# Patient Record
Sex: MALE | Race: WHITE | NOT HISPANIC OR LATINO | Employment: UNEMPLOYED | URBAN - METROPOLITAN AREA
[De-identification: names, ages, dates, MRNs, and addresses within clinical notes are randomized per-mention and may not be internally consistent; named-entity substitution may affect disease eponyms.]

---

## 2017-01-05 ENCOUNTER — OFFICE VISIT (OUTPATIENT)
Dept: NEUROLOGY | Facility: CLINIC | Age: 52
End: 2017-01-05

## 2017-01-05 VITALS
SYSTOLIC BLOOD PRESSURE: 136 MMHG | BODY MASS INDEX: 34.01 KG/M2 | HEIGHT: 74 IN | WEIGHT: 265 LBS | DIASTOLIC BLOOD PRESSURE: 84 MMHG

## 2017-01-05 DIAGNOSIS — R20.0 NUMBNESS AND TINGLING: Primary | ICD-10-CM

## 2017-01-05 DIAGNOSIS — R20.2 NUMBNESS AND TINGLING: Primary | ICD-10-CM

## 2017-01-05 PROCEDURE — 99244 OFF/OP CNSLTJ NEW/EST MOD 40: CPT | Performed by: PSYCHIATRY & NEUROLOGY

## 2017-01-05 NOTE — PROGRESS NOTES
"Subjective   Ken Richard is a 51 y.o. male.     History of Present Illness     The following portions of the patient's history were reviewed today and updated as appropriate:  allergies, current medications, past family history, past medical history, past social history, past surgical history and problem list.      Chief complaint is numbness and the patient is seen today in consultation at the request of the referring health care provider  The time I spent with the patient today was used discussing the differential diagnosis, treatment and management options and prognosis. I addressed all of the patient's questions.  The patient has developed after chemotherapy worsening hearing problems as well as his difficulties with numbness in both hands on occasion improved with certain head positions. He also had radiation to the neck and he had a neck injury a few years ago. He has right shoulder pain also.    Review of Systems   Constitutional: Negative for appetite change, chills and fatigue.   HENT: Negative for congestion, ear pain, facial swelling and sinus pressure.    Eyes: Negative for pain and redness.   Respiratory: Negative for shortness of breath.    Cardiovascular: Negative for chest pain.   Gastrointestinal: Negative for abdominal pain.   Endocrine: Negative for cold intolerance and heat intolerance.   Genitourinary: Negative for dysuria.   Musculoskeletal: Negative for arthralgias.   Skin: Negative for rash.   Allergic/Immunologic: Negative for immunocompromised state.   Neurological: Positive for numbness.   Hematological: Negative for adenopathy.   Psychiatric/Behavioral: Negative for hallucinations.         Objective      height is 74\" (188 cm) and weight is 265 lb (120 kg). His blood pressure is 136/84.     Carotid pulses were palpable bilaterally  The ophthalmological exam showed no blurring of disc margins.    Neurologic Exam     Mental Status   Oriented to person.   Oriented to place. Oriented to " country, city, area and street.   Oriented to time. Oriented to year, month, date, day and season.   Registration: recalls 3 of 3 objects. Recall at 5 minutes: recalls 3 of 3 objects. Follows 3 step commands.   Attention: normal.   Speech: speech is normal   Level of consciousness: alert  Knowledge: consistent with education.   Able to name object. Able to read. Able to repeat. Able to write. Normal comprehension.     Cranial Nerves     CN II   Visual fields full to confrontation.     CN III, IV, VI   Right pupil: Shape: regular. Consensual response: intact. Accommodation: intact.   Left pupil: Shape: regular. Consensual response: intact.   CN III: no CN III palsy  CN VI: no CN VI palsy  Nystagmus: none   Diplopia: none  Ophthalmoparesis: none  Upgaze: normal  Downgaze: normal  Conjugate gaze: present  Vestibulo-ocular reflex: present    CN V   Facial sensation intact.     CN VII   Facial expression full, symmetric.     CN VIII   CN VIII normal.     CN IX, X   CN IX normal.     CN XI   CN XI normal.     CN XII   CN XII normal.     Motor Exam   Muscle bulk: normal  Overall muscle tone: normal  Right arm pronator drift: absent  Left arm pronator drift: absent    Strength   Strength 5/5 except as noted.     Sensory Exam   Right arm light touch: decreased from fingers  Left arm light touch: decreased from fingers  Right leg vibration: decreased from toes  Left leg vibration: decreased from toes    Gait, Coordination, and Reflexes     Gait  Gait: normal    Coordination   Romberg: negative  Finger to nose coordination: normal  Heel to shin coordination: normal  Tandem walking coordination: normal    Tremor   Resting tremor: absent  Intention tremor: absent  Action tremor: absent    Reflexes   Reflexes 2+ except as noted.       Assessment/Plan     Ken was seen today for numbness.    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 He also probably has a right-sided carpal tunnel syndrome because his thenar is flat and he may have an ulnar nerve compression also more in the left than the right side because of his curved little finger. The pain in the shoulder appears to be more likely intrinsic shoulder problem because he has issues with supraspinatus loading test and abduction of the shoulder.  He will be scheduled for EMG nerve conduction studies of both upper extremities look at the possibility of a cervical radiculopathy and nerve compression. I will then discuss further management options with him              Disclaimer: This letter was created using dragon voice recognition software.  This voice recognition software may create errors that may go undetected and can impact the meaning of a sentence or paragraph.  The most frequent error is that the software misidentifies \"he\" and \"she\". However, contextual reading is often able to identify this as a voice recognotion error.  Another frequent error is that the software misidentifies \"the patient\" as \"\"          "

## 2017-01-05 NOTE — LETTER
"January 5, 2017     Isael Francois MD  [address]  [address]  [place] [address]    Patient: Ken Richard   YOB: 1965   Date of Visit: 1/5/2017       Dear Dr. Alessandro MD:    Thank you for referring Ken Richard to me for evaluation. Below are the relevant portions of my assessment and plan of care.        Ken was seen today for numbness.    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 He also probably has a right-sided carpal tunnel syndrome because his thenar is flat and he may have an ulnar nerve compression also more in the left than the right side because of his curved little finger. The pain in the shoulder appears to be more likely intrinsic shoulder problem because he has issues with supraspinatus loading test and abduction of the shoulder.  He will be scheduled for EMG nerve conduction studies of both upper extremities look at the possibility of a cervical radiculopathy and nerve compression. I will then discuss further management options with him              Disclaimer: This letter was created using dragon voice recognition software.  This voice recognition software may create errors that may go undetected and can impact the meaning of a sentence or paragraph.  The most frequent error is that the software misidentifies \"he\" and \"she\". However, contextual reading is often able to identify this as a voice recognotion error.  Another frequent error is that the software misidentifies \"the patient\" as \"\"                        If you have questions, please do not hesitate to call me. I look forward to following Ken along with you.         Sincerely,        Oliver Ayala MD        CC: Maximo JULIAN MD      "

## 2017-01-09 ENCOUNTER — OFFICE VISIT (OUTPATIENT)
Dept: RADIATION ONCOLOGY | Facility: HOSPITAL | Age: 52
End: 2017-01-09

## 2017-01-09 ENCOUNTER — HOSPITAL ENCOUNTER (OUTPATIENT)
Dept: RADIATION ONCOLOGY | Facility: HOSPITAL | Age: 52
Setting detail: RADIATION/ONCOLOGY SERIES
Discharge: HOME | End: 2017-01-09

## 2017-01-09 VITALS
DIASTOLIC BLOOD PRESSURE: 83 MMHG | HEIGHT: 74 IN | RESPIRATION RATE: 16 BRPM | SYSTOLIC BLOOD PRESSURE: 133 MMHG | BODY MASS INDEX: 34.42 KG/M2 | WEIGHT: 268.2 LBS | TEMPERATURE: 98.3 F | OXYGEN SATURATION: 98 % | HEART RATE: 82 BPM

## 2017-01-09 DIAGNOSIS — C09.9 SQUAMOUS CELL CARCINOMA OF LEFT TONSIL (HCC): Primary | ICD-10-CM

## 2017-01-09 NOTE — PROGRESS NOTES
FOLLOW UP NOTE    PATIENT:                                                      Ken Richard  MEDICAL RECORD #:                        [id number]  :                                                          1965  COMPLETION DATE:   2016  DIAGNOSIS:     R. Tonsil Cancer      BRIEF HISTORY:    Routine follow-up visit.  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  After initial poor tolerance with significant nausea, anorexia and weight loss, all of his symptoms have improved or resolved.  He has regained all of his weight and although taste is not completely normal, he denies any dysphagia or odynophagia.  He's had a complete clinical remission.  Restaging PET CT in 2016 showed no evidence of residual neoplasm.    MEDICATIONS: Medication reconciliation for the patient was reviewed and confirmed in the electronic medical record.    Review of Systems   Musculoskeletal:        Neuropathy in arms and hands   Neurological: Positive for numbness.       KPS 90%    Physical Exam   Constitutional: He is oriented to person, place, and time. He appears well-developed and well-nourished.   HENT:   Head: Normocephalic and atraumatic.   Mouth/Throat: Oropharynx is clear and moist.   Mouth is moist and teeth are in good repair.  No suspicious tumor.  No trismus.  The overlying skin is well-healed.  There is a slightly fibrotic left upper neck incision with no associated mass and minimal defect.   Neck: Normal range of motion. Neck supple.   Cardiovascular: Normal rate, regular rhythm and normal heart sounds.    No murmur heard.  Pulmonary/Chest: Effort normal and breath sounds normal. He has no wheezes. He has no rales.   Abdominal: Soft. Bowel sounds are normal. He exhibits no distension. There is no hepatosplenomegaly. There is no tenderness.   Musculoskeletal: Normal range of motion. He exhibits no edema or tenderness.  "  Lymphadenopathy:     He has no cervical adenopathy.     He has no axillary adenopathy.        Right: No supraclavicular adenopathy present.        Left: No supraclavicular adenopathy present.   Neurological: He is alert and oriented to person, place, and time. He has normal strength. No sensory deficit.   Skin: Skin is warm and dry.   Psychiatric: He has a normal mood and affect. His behavior is normal. Judgment and thought content normal.   Nursing note and vitals reviewed.      VITAL SIGNS:   Vitals:    01/09/17 1453   BP: 133/83   Pulse: 82   Resp: 16   Temp: 98.3 °F (36.8 °C)   TempSrc: Oral   SpO2: 98%   Weight: 268 lb 3.2 oz (122 kg)   Height: 74\" (188 cm)   PainSc: 2  Comment: Shoulders and hands form neuropathy       The following portions of the patient's history were reviewed and updated as appropriate: allergies, current medications, past family history, past medical history, past social history, past surgical history and problem list.         Squamous cell carcinoma of left tonsil [C09.9]    IMPRESSION:  No evidence of recurrent head and neck cancer.     RECOMMENDATIONS:  Continue surveillance with ENT and medical oncology.  I also encouraged continued good oral care and at least biannual dental cleaning.  Physical activity and maintenance of body weight was also encouraged.     Return if symptoms worsen or fail to improve.     Boone Emery MD    Errors in dictation may reflect use of voice recognition software and not all errors in transcription may have been detected prior to signing.  "

## 2017-01-18 ENCOUNTER — OFFICE VISIT (OUTPATIENT)
Dept: NEUROLOGY | Facility: CLINIC | Age: 52
End: 2017-01-18

## 2017-01-18 DIAGNOSIS — R20.0 NUMBNESS AND TINGLING: Primary | ICD-10-CM

## 2017-01-18 DIAGNOSIS — G56.23 CUBITAL TUNNEL SYNDROME OF BOTH UPPER EXTREMITIES: ICD-10-CM

## 2017-01-18 DIAGNOSIS — G56.03 BILATERAL CARPAL TUNNEL SYNDROME: ICD-10-CM

## 2017-01-18 DIAGNOSIS — R20.2 NUMBNESS AND TINGLING: Primary | ICD-10-CM

## 2017-01-18 PROCEDURE — 95911 NRV CNDJ TEST 9-10 STUDIES: CPT | Performed by: PSYCHIATRY & NEUROLOGY

## 2017-01-18 PROCEDURE — 95886 MUSC TEST DONE W/N TEST COMP: CPT | Performed by: PSYCHIATRY & NEUROLOGY

## 2017-01-18 NOTE — LETTER
January 18, 2017     Isael Francois MD  [address]  [address]  [place] [address]    Patient: Ken Richard   YOB: 1965   Date of Visit: 1/18/2017       Dear Dr. Alessandro MD:    Thank you for referring Ken Richard to me for evaluation. Below are the relevant portions of my assessment and plan of care.                   If you have questions, please do not hesitate to call me. I look forward to following Ken along with you.         Sincerely,        Oliver Ayala MD        CC: No Recipients

## 2017-01-18 NOTE — PROGRESS NOTES
Procedure   EMG & Nerve Conduction Test  Date/Time: 1/18/2017 10:07 AM  Performed by: HI YOUSIF  Authorized by: HI YOUSIF                 [facility]   [address] [place] [address], [address]  [place]  [address]   Phone: [phone]  FAX: [phone]           Patient: henok arnett YOB: 1965  Gender: Male  Reason for study: see below in history section      Visit Date: 1/18/2017 10:32  Age: 51 Years 3 Months Old  Examining Physician: Jamie       The patient has a chief complaint and history of bilateral, upper extremity, numbness,    The patient is referred to have this problem evaluated today with EMG and nerve conduction studies.  The performing physician also acted as a technician on this study.    A brief neurological exam, revealed no abnormalities in muscle bulk strength reflexes and sensationbilateral, upper extremity, numbness,      Sensory NCS      Nerve / Sites Rec. Site Distance Onset Lat NP Amp Onset Nick     cm ms µV m/s   L Median, Ulnar - Ring finger comparison      Median Wrist Ring finger 14 4.0 2.3 35      Ulnar Wrist Ring finger 14 4.0 2.1 35   R Median, Ulnar - Ring finger comparison      Median Wrist Ring finger 14 4.6 2.1 30      Ulnar Wrist Ring finger 14 4.1 5.6 34   L Median, Radial - Thumb comparison      Median Wrist Thumb 10 4.2 2.6 24      Radial Wrist Thumb 10 2.7 8.1 38           R Median, Radial - Thumb comparison      Median Wrist Thumb 10 4.8 2.1 21      Radial Wrist Thumb 10 2.0 6.2 49               Motor NCS      Nerve / Sites Muscle Distance Latency Amplitude Velocity     cm ms mV m/s   L Median - APB      Wrist APB 8 7.66 10.1       Elbow APB 21 11.30 10.7 58   R Median - APB      Wrist APB 8 6.15 7.5       Elbow APB 21 9.58 7.9 61   L Ulnar - ADM      Wrist ADM 8 4.01 6.1       B.Elbow ADM 12 5.68 5.3 72      A.Elbow ADM 20 10.52 5.6 41   R Ulnar - ADM      Wrist ADM 8 3.85 7.0       B.Elbow ADM 12 5.73 6.5 64      A.Elbow ADM 20  9.48 6.2 53       F  Wave      Nerve F-min    ms   L Median - APB 29   L Ulnar - ADM 35   R Ulnar - ADM 34   R Median - APB 29     EMG Summary Table     Spontaneous MUAP Recruitment    IA Fib PSW Fasc H.F. Amp Dur. PPP Pattern   L. ABD POLL BREVIS N None None None None N N N N   L. FIRST D INTEROSS N None None None None N N N N   L. FLEX POLL LONG N None None None None N N N N   L. PRON QUAD N None None None None N N N N   L. PRON TERES N None None None None N N N N   L. TRICEPS N None None None None N N N N   L. DELTOID N None None None None N N N N   L. CERV PSPINAL N None None None None N N N N   R. CERV PSPINAL N None None None None N N N N   R. TRICEPS N None None None None N N N N   R. DELTOID N None None None None N N N N   R. ABD POLL BREVIS N None None None None N N N N   R. FIRST D INTEROSS N None None None None N N N N   R. FLEX POLL LONG N None None None None N N N N   R. PRON QUAD N None None None None N N N N   R. PRON TERES N None None None None N N N N     1.) SNAPs showed increased radial to median latency difference (>0.4m/s)  2.) CMAPs showed slowing of the median nerves over the wrist       and ulnar nerves over the cubital tunnel  3.) F-waves were normal where recordable  4.) EMG was normal in all muscles tested.    These findings are compatible with    1.) moderate bilateral carpal tunnel syndrome  2.) mild bilateral cubital tunnel syndrome    The patient will be sent to hand surgery for further care    All NCS were performed at 32C or greater.        Oliver Ayala M.D.                   Diagnoses and all orders for this visit:    Numbness and tingling

## 2017-04-10 ENCOUNTER — TRANSCRIBE ORDERS (OUTPATIENT)
Dept: ADMINISTRATIVE | Facility: HOSPITAL | Age: 52
End: 2017-04-10

## 2017-04-21 ENCOUNTER — OFFICE VISIT (OUTPATIENT)
Dept: ONCOLOGY | Facility: CLINIC | Age: 52
End: 2017-04-21

## 2017-04-21 VITALS
HEART RATE: 89 BPM | BODY MASS INDEX: 37.35 KG/M2 | WEIGHT: 291 LBS | HEIGHT: 74 IN | TEMPERATURE: 97.9 F | DIASTOLIC BLOOD PRESSURE: 93 MMHG | RESPIRATION RATE: 16 BRPM | SYSTOLIC BLOOD PRESSURE: 148 MMHG

## 2017-04-21 DIAGNOSIS — C09.9 SQUAMOUS CELL CARCINOMA OF LEFT TONSIL (HCC): Primary | ICD-10-CM

## 2017-04-21 PROCEDURE — 99213 OFFICE O/P EST LOW 20 MIN: CPT | Performed by: NURSE PRACTITIONER

## 2017-04-21 NOTE — PROGRESS NOTES
"      PROBLEM LIST:  Oncology/Hematology History     . 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   2. Severe allergic reaction to cetuximab. Pt did not tolerate cisplatin due to Severe Nausea  3. Completed IFRT on 5/25/16  4. Pet Scan 4 months post treatment 10/2016 - Negative       Squamous cell carcinoma of left tonsil     2/17/2016 Initial Diagnosis     Squamous cell carcinoma of left tonsil     4/4/2016 - 5/25/2016 Radiation     Radiation OncologyTreatment Course: Dr. Emery          REASON FOR VISIT: Follow-up of my head and neck cancer      Subjective     HISTORY OF PRESENT ILLNESS:   Mr. Richard is here for follow up evaluation of stage II squamous cell carcinoma of the left tonsil. Clinically doing well. He is eating and drinking without difficulty. He is slowly regaining his physical stamina. He reports the numbness in bilateral hands is much improved after an injection and braces for carpal tunnel. He is maintaining his usual daily activities.      Past Medical History, Past Surgical History, Social History, Family History have been reviewed and are without significant changes except as mentioned.    Review of Systems   A comprehensive 14 point review of systems was performed and was negative except as mentioned.    Medications:  The current medication list was reviewed in the EMR    ALLERGIES:    Allergies   Allergen Reactions   • Adhesive Tape    • Bactrim [Sulfamethoxazole-Trimethoprim]    • Banana    • Eggs Or Egg-Derived Products    • Levofloxacin    • Peanut-Containing Drug Products    • Sulfa Antibiotics    • Wheat Extract        Objective      /93  Pulse 89  Temp 97.9 °F (36.6 °C) (Temporal Artery )   Resp 16  Ht 74\" (188 cm)  Wt 291 lb (132 kg)  BMI 37.36 kg/m2         General: well appearing, in no acute distress   HEENT: sclera anicteric, oropharynx clear, neck is supple  Lymphatics: no " cervical, supraclavicular, or axillary adenopathy  Cardiovascular: regular rate and rhythm, no murmurs  Lungs: clear to auscultation bilaterally  Abdomen: soft, nontender, nondistended.  No palpable masses or organomegaly  Extremeties: no lower extremity edema, cords or calf tenderness  Skin: no rashes, lesions, bruising, or petechiae    RECENT LABS:  Hematology WBC   Date Value Ref Range Status   06/20/2016 4.56 3.50 - 10.80 10*3/mm3 Final     Hemoglobin   Date Value Ref Range Status   06/20/2016 10.7 (L) 13.1 - 17.5 g/dL Final     Hematocrit   Date Value Ref Range Status   06/20/2016 31.7 (L) 38.9 - 50.9 % Final     MCV   Date Value Ref Range Status   06/20/2016 98.8 80.0 - 99.0 fL Final     RDW   Date Value Ref Range Status   06/20/2016 15.3 (H) 11.3 - 14.5 % Final     MPV   Date Value Ref Range Status   06/20/2016 9.3 6.0 - 12.0 fL Final     Platelets   Date Value Ref Range Status   06/20/2016 210 150 - 450 10*3/mm3 Final     Immature Grans %   Date Value Ref Range Status   06/20/2016 0.2 0.0 - 0.6 % Final     Neutrophils, Absolute   Date Value Ref Range Status   06/20/2016 3.86 1.50 - 8.30 10*3/mm3 Final     Lymphocytes, Absolute   Date Value Ref Range Status   06/20/2016 0.40 (L) 0.60 - 4.80 10*3/mm3 Final     Monocytes, Absolute   Date Value Ref Range Status   06/20/2016 0.21 0.00 - 1.00 10*3/mm3 Final     Eosinophils, Absolute   Date Value Ref Range Status   06/20/2016 0.06 (L) 0.10 - 0.30 10*3/mm3 Final     Basophils, Absolute   Date Value Ref Range Status   06/20/2016 0.02 0.00 - 0.20 10*3/mm3 Final     Immature Grans, Absolute   Date Value Ref Range Status   06/20/2016 0.01 0.00 - 0.03 10*3/mm3 Final       Glucose   Date Value Ref Range Status   06/20/2016 116 (H) 70 - 100 mg/dL Final     Sodium   Date Value Ref Range Status   06/20/2016 143 132 - 146 mmol/L Final     Potassium   Date Value Ref Range Status   06/20/2016 3.9 3.5 - 5.5 mmol/L Final     CO2   Date Value Ref Range Status   06/20/2016 28.0  20.0 - 31.0 mmol/L Final     Chloride   Date Value Ref Range Status   06/20/2016 109 99 - 109 mmol/L Final     Anion Gap   Date Value Ref Range Status   06/20/2016 6.0 3.0 - 11.0 mmol/L Final     Creatinine   Date Value Ref Range Status   06/20/2016 0.90 0.60 - 1.30 mg/dL Final     BUN   Date Value Ref Range Status   06/20/2016 12 9 - 23 mg/dL Final     BUN/Creatinine Ratio   Date Value Ref Range Status   06/20/2016 13.3 7.0 - 25.0 Final     Calcium   Date Value Ref Range Status   06/20/2016 9.1 8.7 - 10.4 mg/dL Final     eGFR Non  Amer   Date Value Ref Range Status   06/20/2016 90 >60 mL/min/1.73 Final     Alkaline Phosphatase   Date Value Ref Range Status   06/20/2016 65 25 - 100 U/L Final     Total Protein   Date Value Ref Range Status   06/20/2016 6.3 5.7 - 8.2 g/dL Final     ALT (SGPT)   Date Value Ref Range Status   06/20/2016 94 (H) 7 - 40 U/L Final     AST (SGOT)   Date Value Ref Range Status   06/20/2016 68 (H) 0 - 33 U/L Final     Total Bilirubin   Date Value Ref Range Status   06/20/2016 0.6 0.3 - 1.2 mg/dL Final     Albumin   Date Value Ref Range Status   06/20/2016 3.70 3.20 - 4.80 g/dL Final     Globulin   Date Value Ref Range Status   06/20/2016 2.6 gm/dL Final     A/G Ratio   Date Value Ref Range Status   06/20/2016 1.4 g/dL Final       No results found for: LDH, URICACID       Assessment/Plan   51-year-old gentleman with stage II head and neck cancer. Now 1 year from diagnosis. Clinically no sign of recurrence. He reports he had a PET scan at the [facility] per Dr. Wood a couple of weeks and it was normal. We will request a copy of the report for review. We will plan on seeing him back in 6 months for follow up evaluation. He has been instructed to contact us in the interm if any new symptoms arise.                   Sybil Amaya APRCLAUDIO  [facility]    4/21/2017

## 2017-05-12 ENCOUNTER — HOSPITAL ENCOUNTER (OUTPATIENT)
Dept: GENERAL RADIOLOGY | Facility: HOSPITAL | Age: 52
Discharge: HOME | End: 2017-05-12
Admitting: SURGERY

## 2017-05-12 ENCOUNTER — APPOINTMENT (OUTPATIENT)
Dept: PREADMISSION TESTING | Facility: HOSPITAL | Age: 52
End: 2017-05-12

## 2017-05-12 LAB
ANION GAP SERPL CALCULATED.3IONS-SCNC: -1 MMOL/L (ref 3–11)
BUN BLD-MCNC: 12 MG/DL (ref 9–23)
BUN/CREAT SERPL: 15 (ref 7–25)
CALCIUM SPEC-SCNC: 9.6 MG/DL (ref 8.7–10.4)
CHLORIDE SERPL-SCNC: 108 MMOL/L (ref 99–109)
CO2 SERPL-SCNC: 32 MMOL/L (ref 20–31)
CREAT BLD-MCNC: 0.8 MG/DL (ref 0.6–1.3)
DEPRECATED RDW RBC AUTO: 43.6 FL (ref 37–54)
ERYTHROCYTE [DISTWIDTH] IN BLOOD BY AUTOMATED COUNT: 12.3 % (ref 11.3–14.5)
GFR SERPL CREATININE-BSD FRML MDRD: 103 ML/MIN/1.73
GLUCOSE BLD-MCNC: 102 MG/DL (ref 70–100)
HCT VFR BLD AUTO: 41.5 % (ref 38.9–50.9)
HGB BLD-MCNC: 14.1 G/DL (ref 13.1–17.5)
MCH RBC QN AUTO: 33 PG (ref 27–31)
MCHC RBC AUTO-ENTMCNC: 34 G/DL (ref 32–36)
MCV RBC AUTO: 97.2 FL (ref 80–99)
PLATELET # BLD AUTO: 167 10*3/MM3 (ref 150–450)
PMV BLD AUTO: 10.2 FL (ref 6–12)
POTASSIUM BLD-SCNC: 4 MMOL/L (ref 3.5–5.5)
RBC # BLD AUTO: 4.27 10*6/MM3 (ref 4.2–5.76)
SODIUM BLD-SCNC: 139 MMOL/L (ref 132–146)
WBC NRBC COR # BLD: 4.93 10*3/MM3 (ref 3.5–10.8)

## 2017-05-12 PROCEDURE — 85027 COMPLETE CBC AUTOMATED: CPT | Performed by: SURGERY

## 2017-05-12 PROCEDURE — 36415 COLL VENOUS BLD VENIPUNCTURE: CPT

## 2017-05-12 PROCEDURE — 71020 HC CHEST PA AND LATERAL: CPT

## 2017-05-12 PROCEDURE — 80048 BASIC METABOLIC PNL TOTAL CA: CPT | Performed by: SURGERY

## 2017-08-03 ENCOUNTER — TELEPHONE (OUTPATIENT)
Dept: ONCOLOGY | Facility: CLINIC | Age: 52
End: 2017-08-03

## 2017-08-03 NOTE — TELEPHONE ENCOUNTER
Educated that patient is ok to take thyroid medication and PCP to monitor.  Patient verbalized understanding.

## 2017-08-03 NOTE — TELEPHONE ENCOUNTER
----- Message from Silverio Salazar sent at 8/3/2017  9:21 AM EDT -----  Regarding: Gil medication question  Contact: [phone]  Patient was prescribed Levothyroxine from Jaylin KIM at [facility] in [facility], he wants to know if it was ok to take. Taking it for his thyroid. Patient was not sure of what his levels were.  [phone] is the number I pulled from the web for you.

## 2017-08-16 ENCOUNTER — OFFICE VISIT (OUTPATIENT)
Dept: ONCOLOGY | Facility: CLINIC | Age: 52
End: 2017-08-16

## 2017-08-16 VITALS
BODY MASS INDEX: 37.73 KG/M2 | SYSTOLIC BLOOD PRESSURE: 142 MMHG | DIASTOLIC BLOOD PRESSURE: 86 MMHG | HEART RATE: 96 BPM | TEMPERATURE: 98.3 F | RESPIRATION RATE: 20 BRPM | WEIGHT: 294 LBS | HEIGHT: 74 IN

## 2017-08-16 DIAGNOSIS — E89.0 POSTABLATIVE HYPOTHYROIDISM: Primary | ICD-10-CM

## 2017-08-16 PROCEDURE — 99213 OFFICE O/P EST LOW 20 MIN: CPT | Performed by: INTERNAL MEDICINE

## 2017-08-16 RX ORDER — LEVOTHYROXINE SODIUM 0.05 MG/1
TABLET ORAL
COMMUNITY
Start: 2017-08-02 | End: 2018-10-25 | Stop reason: DRUGHIGH

## 2017-08-16 NOTE — PROGRESS NOTES
PROBLEM LIST:  Oncology/Hematology History     . 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   2. Severe allergic reaction to cetuximab. Pt did not tolerate cisplatin due to Severe Nausea  3. Completed IFRT on 5/25/16  4. Pet Scan 4 months post treatment 10/2016 - Negative       Squamous cell carcinoma of left tonsil     2/17/2016 Initial Diagnosis     Squamous cell carcinoma of left tonsil     4/4/2016 - 5/25/2016 Radiation     Radiation OncologyTreatment Course: Dr. Emery          REASON FOR VISIT: Follow-up of my head and neck cancer      Subjective     HISTORY OF PRESENT ILLNESS:   Mr. Richard is here for follow up evaluation of stage II squamous cell carcinoma of the left tonsil. He is now year and a half out from his diagnosis.  Doing clinically well.  He's gained all his weight back.  He was diagnosed with hypothyroidism which is expected due to the radiation.  Otherwise denies any dysphagia.  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  He is almost back to his pre-cancer weight of 309.    Past Medical History, Past Surgical History, Social History, Family History have been reviewed and are without significant changes except as mentioned.    Review of Systems   Constitutional: Positive for fatigue and unexpected weight change.   HENT: Positive for hearing loss.    Eyes: Negative.    Respiratory: Negative.    Cardiovascular: Negative.    Gastrointestinal: Negative.    Endocrine: Negative.    Genitourinary: Negative.    Musculoskeletal: Negative.    Skin: Negative.    Allergic/Immunologic: Negative.    Neurological: Negative.    Hematological: Negative.    Psychiatric/Behavioral: Negative.       A comprehensive 14 point review of systems was performed and was negative except as mentioned.    Medications:  The current medication list was reviewed in the EMR    ALLERGIES:    Allergies   Allergen  "Reactions   • Adhesive Tape    • Bactrim [Sulfamethoxazole-Trimethoprim]    • Banana    • Eggs Or Egg-Derived Products    • Levofloxacin    • Peanut-Containing Drug Products    • Sulfa Antibiotics    • Wheat Extract        Objective      /86  Pulse 96  Temp 98.3 °F (36.8 °C)  Resp 20  Ht 74\" (188 cm)  Wt 294 lb (133 kg)  BMI 37.75 kg/m2         General: well appearing, in no acute distress   HEENT: sclera anicteric, oropharynx clear, neck is supple  Lymphatics: no cervical, supraclavicular, or axillary adenopathy  Cardiovascular: regular rate and rhythm, no murmurs  Lungs: clear to auscultation bilaterally  Abdomen: soft, nontender, nondistended.  No palpable masses or organomegaly  Extremeties: no lower extremity edema, cords or calf tenderness  Skin: no rashes, lesions, bruising, or petechiae        Assessment/Plan     51-year-old gentleman with stage II head and neck cancer.  His exam is fairly unrevealing.  He had a PET scan with Dr. Wood in April that was clear.  His hypothyroidism is being managed by Ms. Daley his PCP.  Otherwise he needs to work on maintaing a healthy diet and exercise.  I will see him back in 6 months.            Kathleen Cervantes MD  [facility] Hematology and Oncology    8/16/2017    "

## 2017-11-15 ENCOUNTER — TELEPHONE (OUTPATIENT)
Dept: RADIATION ONCOLOGY | Facility: HOSPITAL | Age: 52
End: 2017-11-15

## 2017-11-15 NOTE — TELEPHONE ENCOUNTER
Pt's wife called stating that pt is having severe muscle spasms in his neck.  She says that they are so severe sometimes that his hearing aide comes out.  She denies the pt having a hard time swallowing or having pain inside his throat.  Discussed with Dr. Emery and told the pt he recommends that she get the pt in to see his ENT.  Wife verbalized understanding.

## 2017-11-28 ENCOUNTER — OFFICE VISIT (OUTPATIENT)
Dept: RADIATION ONCOLOGY | Facility: HOSPITAL | Age: 52
End: 2017-11-28

## 2017-11-28 ENCOUNTER — TRANSCRIBE ORDERS (OUTPATIENT)
Dept: ADMINISTRATIVE | Facility: HOSPITAL | Age: 52
End: 2017-11-28

## 2017-11-28 ENCOUNTER — HOSPITAL ENCOUNTER (OUTPATIENT)
Dept: RADIATION ONCOLOGY | Facility: HOSPITAL | Age: 52
Setting detail: RADIATION/ONCOLOGY SERIES
Discharge: HOME | End: 2017-11-28

## 2017-11-28 ENCOUNTER — DOCUMENTATION (OUTPATIENT)
Dept: NUTRITION | Facility: HOSPITAL | Age: 52
End: 2017-11-28

## 2017-11-28 VITALS
WEIGHT: 296 LBS | BODY MASS INDEX: 38 KG/M2 | SYSTOLIC BLOOD PRESSURE: 141 MMHG | OXYGEN SATURATION: 97 % | DIASTOLIC BLOOD PRESSURE: 96 MMHG | RESPIRATION RATE: 20 BRPM | HEART RATE: 84 BPM | TEMPERATURE: 97 F

## 2017-11-28 DIAGNOSIS — R22.1 NECK MASS: Primary | ICD-10-CM

## 2017-11-28 DIAGNOSIS — C09.9 SQUAMOUS CELL CARCINOMA OF LEFT TONSIL (HCC): Primary | ICD-10-CM

## 2017-11-28 LAB
ANION GAP SERPL CALCULATED.3IONS-SCNC: 4 MMOL/L (ref 3–11)
BUN BLD-MCNC: 15 MG/DL (ref 9–23)
BUN/CREAT SERPL: 16.7 (ref 7–25)
CALCIUM SPEC-SCNC: 9.2 MG/DL (ref 8.7–10.4)
CHLORIDE SERPL-SCNC: 108 MMOL/L (ref 99–109)
CO2 SERPL-SCNC: 27 MMOL/L (ref 20–31)
CREAT BLD-MCNC: 0.9 MG/DL (ref 0.6–1.3)
GFR SERPL CREATININE-BSD FRML MDRD: 89 ML/MIN/1.73
GLUCOSE BLD-MCNC: 107 MG/DL (ref 70–100)
POTASSIUM BLD-SCNC: 4.5 MMOL/L (ref 3.5–5.5)
SODIUM BLD-SCNC: 139 MMOL/L (ref 132–146)

## 2017-11-28 PROCEDURE — 80048 BASIC METABOLIC PNL TOTAL CA: CPT | Performed by: RADIOLOGY

## 2017-11-28 PROCEDURE — G0463 HOSPITAL OUTPT CLINIC VISIT: HCPCS

## 2017-11-28 RX ORDER — DICLOFENAC POTASSIUM 50 MG/1
TABLET, FILM COATED ORAL
COMMUNITY
Start: 2017-10-23 | End: 2018-04-26 | Stop reason: DRUGHIGH

## 2017-11-28 NOTE — PROGRESS NOTES
FOLLOW UP NOTE    PATIENT:                                                      Ken Richard  MEDICAL RECORD #:                        [id number]  :                                                          1965  COMPLETION DATE:    16  DIAGNOSIS:     Squamous cell carcinoma of left tonsil    Staging form: Pharynx - Oropharynx, AJCC V7    - Pathologic stage from 2016: Stage WADE (T3, N2a, cM0) - Signed by Kathleen Cervantes MD on 2016      BRIEF HISTORY:    Patient requested a follow-up visit.  He has been experiencing more frequent spasms in the left neck.  He has had up to 15 such spasms daily over the past month.  More recently, this problem has been waning.    He has seen Dr. Wood and was felt to have no evidence of recurrent neoplasm.  He was referred to physical therapy.  He is also ordered a follow-up PET CT to rule out disease recurrence.  Patient has otherwise been feeling well area he's advanced his diet and gaining weight.     Ref Range & Units 11:43 AM     Glucose 70 - 100 mg/dL 107 (H)   BUN 9 - 23 mg/dL 15   Creatinine 0.60 - 1.30 mg/dL 0.90   Sodium 132 - 146 mmol/L 139   Potassium 3.5 - 5.5 mmol/L 4.5   Chloride 99 - 109 mmol/L 108   CO2 20.0 - 31.0 mmol/L 27.0   Calcium 8.7 - 10.4 mg/dL 9.2   eGFR Non African Amer >60 mL/min/1.73 89   BUN/Creatinine Ratio 7.0 - 25.0 16.7   Anion Gap 3.0 - 11.0 mmol/L 4.0           MEDICATIONS: Medication reconciliation for the patient was reviewed and confirmed in the electronic medical record.    Review of Systems   Constitutional: Negative.    HENT:   Positive for hearing loss and tinnitus.    Eyes: Negative.    Respiratory: Positive for cough, shortness of breath and wheezing.    Cardiovascular: Positive for leg swelling.   Gastrointestinal: Negative.    Endocrine: Negative.    Genitourinary: Negative.     Musculoskeletal: Positive for back pain, neck pain and neck stiffness.   Skin: Negative.    Neurological: Positive for dizziness,  headaches and light-headedness.   Hematological: Negative.    Psychiatric/Behavioral: Negative.                     KPS 80%    Physical Exam   Constitutional: He is oriented to person, place, and time. He appears well-developed and well-nourished.   HENT:   Head: Normocephalic and atraumatic.   Mouth/Throat: Oropharynx is clear and moist.   Left neck incision well-healed.  Diffuse fibrosis but no significant volume loss in the left neck.  No suspicious mass.  No suspicious intraoral lesions.  Mild trismus.   Neck: Normal range of motion. Neck supple.   Cardiovascular: Normal rate, regular rhythm and normal heart sounds.    No murmur heard.  Pulmonary/Chest: Effort normal and breath sounds normal. He has no wheezes. He has no rales.   Abdominal: Soft. Bowel sounds are normal. He exhibits no distension. There is no hepatosplenomegaly. There is no tenderness.   Musculoskeletal: Normal range of motion. He exhibits no edema or tenderness.   Lymphadenopathy:     He has no cervical adenopathy.     He has no axillary adenopathy.        Right: No supraclavicular adenopathy present.        Left: No supraclavicular adenopathy present.   Neurological: He is alert and oriented to person, place, and time. He has normal strength. No sensory deficit.   Skin: Skin is warm and dry.   Psychiatric: He has a normal mood and affect. His behavior is normal. Judgment and thought content normal.   Nursing note and vitals reviewed.      VITAL SIGNS:   Vitals:    11/28/17 1104   BP: 141/96   Pulse: 84   Resp: 20   Temp: 97 °F (36.1 °C)   TempSrc: Temporal Artery    SpO2: 97%   Weight: 296 lb (134 kg)   PainSc:   2   PainLoc: Neck       The following portions of the patient's history were reviewed and updated as appropriate: allergies, current medications, past family history, past medical history, past social history, past surgical history and problem list.         Ken was seen today for tonsil cancer.    Diagnoses and all orders for this  visit:    Squamous cell carcinoma of left tonsil  -     Ambulatory Referral to Social Work  -     Basic Metabolic Panel  -     Ambulatory Referral to Speech Therapy         IMPRESSION:  Left tonsil carcinoma, 1-1/2 years after definitive treatment with radiotherapy/chemotherapy.  No clinical evidence of recurrent neoplasm.  Left neck spasms.  No electrolyte abnormalities.  Physical therapy has been recommended.  We also discussed the fact that he never did follow-up with speech pathology for thorough evaluation and has not been doing the recommended exercises.    RECOMMENDATIONS:  Referral back to speech pathology, in addition to planned imaging and physical therapy.    Return in about 2 weeks (around 12/12/2017) for Office Visit.    Boone Emery MD    Errors in dictation may reflect use of voice recognition software and not all errors in transcription may have been detected prior to signing.

## 2017-11-28 NOTE — PROGRESS NOTES
Spoke with Nicole in [facility] and informed her that I had placed an order for speech.  She states they will contact the pt.

## 2017-12-01 ENCOUNTER — TELEPHONE (OUTPATIENT)
Dept: SOCIAL WORK | Facility: HOSPITAL | Age: 52
End: 2017-12-01

## 2017-12-01 NOTE — TELEPHONE ENCOUNTER
EDUARDO called pt to follow up on recent distress score of 8.  SW left a message asking pt to call back at his convenience.  SW available for ongoing support and resource needs.

## 2017-12-05 ENCOUNTER — HOSPITAL ENCOUNTER (OUTPATIENT)
Dept: PET IMAGING | Facility: HOSPITAL | Age: 52
Discharge: HOME | End: 2017-12-05
Attending: OTOLARYNGOLOGY

## 2017-12-05 ENCOUNTER — HOSPITAL ENCOUNTER (OUTPATIENT)
Dept: PET IMAGING | Facility: HOSPITAL | Age: 52
Discharge: HOME | End: 2017-12-05
Attending: OTOLARYNGOLOGY | Admitting: OTOLARYNGOLOGY

## 2017-12-05 DIAGNOSIS — R22.1 NECK MASS: ICD-10-CM

## 2017-12-05 LAB — GLUCOSE BLDC GLUCOMTR-MCNC: 93 MG/DL (ref 70–130)

## 2017-12-05 PROCEDURE — 82962 GLUCOSE BLOOD TEST: CPT

## 2017-12-05 PROCEDURE — A9552 F18 FDG: HCPCS | Performed by: OTOLARYNGOLOGY

## 2017-12-05 PROCEDURE — 78815 PET IMAGE W/CT SKULL-THIGH: CPT

## 2017-12-05 PROCEDURE — 0 FLUDEOXYGLUCOSE F18 SOLUTION: Performed by: OTOLARYNGOLOGY

## 2017-12-05 RX ADMIN — FLUDEOXYGLUCOSE F18 1 DOSE: 300 INJECTION INTRAVENOUS at 13:31

## 2017-12-11 ENCOUNTER — TELEPHONE (OUTPATIENT)
Dept: RADIATION ONCOLOGY | Facility: HOSPITAL | Age: 52
End: 2017-12-11

## 2017-12-11 NOTE — TELEPHONE ENCOUNTER
Called pt upon Dr. Emery's request.  I explained that I had spoken to SLP to inquire about getting him treatment closer to home.  He states that he does not want to drive to [place].  I explained that SLP suggested that he call the hospital in [place] to ask if they had outpt services. Pt verbalized understanding and states he will contact [facility].

## 2017-12-12 ENCOUNTER — HOSPITAL ENCOUNTER (OUTPATIENT)
Dept: RADIATION ONCOLOGY | Facility: HOSPITAL | Age: 52
Setting detail: RADIATION/ONCOLOGY SERIES
Discharge: HOME | End: 2017-12-12

## 2018-04-26 ENCOUNTER — OFFICE VISIT (OUTPATIENT)
Dept: ONCOLOGY | Facility: CLINIC | Age: 53
End: 2018-04-26

## 2018-04-26 VITALS
TEMPERATURE: 97.7 F | DIASTOLIC BLOOD PRESSURE: 88 MMHG | RESPIRATION RATE: 18 BRPM | SYSTOLIC BLOOD PRESSURE: 123 MMHG | WEIGHT: 313 LBS | BODY MASS INDEX: 40.17 KG/M2 | HEIGHT: 74 IN | HEART RATE: 90 BPM

## 2018-04-26 DIAGNOSIS — C09.9 SQUAMOUS CELL CARCINOMA OF LEFT TONSIL (HCC): Primary | ICD-10-CM

## 2018-04-26 PROCEDURE — 99213 OFFICE O/P EST LOW 20 MIN: CPT | Performed by: INTERNAL MEDICINE

## 2018-04-26 RX ORDER — CYCLOBENZAPRINE HCL 10 MG
10 TABLET ORAL
COMMUNITY
Start: 2018-04-11 | End: 2019-03-15

## 2018-04-26 RX ORDER — DICLOFENAC SODIUM 75 MG/1
TABLET, DELAYED RELEASE ORAL
COMMUNITY
Start: 2018-04-11 | End: 2019-03-15

## 2018-04-26 RX ORDER — ERGOCALCIFEROL 1.25 MG/1
CAPSULE ORAL
COMMUNITY
Start: 2018-04-12 | End: 2019-03-15

## 2018-04-26 RX ORDER — BUDESONIDE AND FORMOTEROL FUMARATE DIHYDRATE 160; 4.5 UG/1; UG/1
3 AEROSOL RESPIRATORY (INHALATION)
COMMUNITY
Start: 2018-04-06 | End: 2022-10-07 | Stop reason: SDUPTHER

## 2018-04-26 NOTE — PROGRESS NOTES
PROBLEM LIST:  Oncology/Hematology History     . 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   2. Severe allergic reaction to cetuximab. Pt did not tolerate cisplatin due to Severe Nausea  3. Completed IFRT on 5/25/16  4. Pet Scan 4 months post treatment 10/2016 - Negative       Squamous cell carcinoma of left tonsil     2/17/2016 Initial Diagnosis     Squamous cell carcinoma of left tonsil     4/4/2016 - 5/25/2016 Radiation     Radiation OncologyTreatment Course: Dr. Emery          REASON FOR VISIT: Follow-up of my head and neck cancer      Subjective     HISTORY OF PRESENT ILLNESS:   Mr. Richard is here for follow up evaluation of stage II squamous cell carcinoma of the left tonsil. He is 2 years from his diagnosis.  Doing clinically well.  He's gained all his weight back.  He was diagnosed with hypothyroidism which is expected due to the radiation.  Otherwise denies any dysphagia.  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  He is back to his pre-cancer weight .  Had right jaw spasms.  Past Medical History, Past Surgical History, Social History, Family History have been reviewed and are without significant changes except as mentioned.    Review of Systems   Constitutional: Positive for fatigue and unexpected weight change.   HENT: Positive for hearing loss.    Eyes: Negative.    Respiratory: Negative.    Cardiovascular: Negative.    Gastrointestinal: Negative.    Endocrine: Negative.    Genitourinary: Negative.    Musculoskeletal: Negative.    Skin: Negative.    Allergic/Immunologic: Negative.    Neurological: Negative.    Hematological: Negative.    Psychiatric/Behavioral: Negative.       A comprehensive 14 point review of systems was performed and was negative except as mentioned.    Medications:  The current medication list was reviewed in the EMR    ALLERGIES:    Allergies   Allergen Reactions   •  "Adhesive Tape    • Bactrim [Sulfamethoxazole-Trimethoprim]    • Banana    • Eggs Or Egg-Derived Products    • Levofloxacin    • Peanut-Containing Drug Products    • Sulfa Antibiotics    • Wheat Extract        Objective      /88   Pulse 90   Temp 97.7 °F (36.5 °C)   Resp 18   Ht 188 cm (74\")   Wt (!) 142 kg (313 lb)   BMI 40.19 kg/m²          General: well appearing, in no acute distress   HEENT: sclera anicteric, oropharynx clear, neck is supple  Lymphatics: no cervical, supraclavicular, or axillary adenopathy  Cardiovascular: regular rate and rhythm, no murmurs  Lungs: clear to auscultation bilaterally  Abdomen: soft, nontender, nondistended.  No palpable masses or organomegaly  Extremeties: no lower extremity edema, cords or calf tenderness  Skin: no rashes, lesions, bruising, or petechiae        Assessment/Plan     1. stage II head and neck cancer.  His exam is fairly unrevealing today. He continues to have some jaw spasms.  He had a PET scan with Dr. Wood 5 months ago and per pt that was clear.  He is planning another petscan next month.   Otherwise he needs to work on maintaining a healthy diet and exercise.     2. Radiation related Hypothyroidism: on replacement synthroid     I will see him back in 6 months.            Kathleen Cervantes MD  [facility] Hematology and Oncology    4/26/2018    "

## 2018-10-03 ENCOUNTER — TRANSCRIBE ORDERS (OUTPATIENT)
Dept: ADMINISTRATIVE | Facility: HOSPITAL | Age: 53
End: 2018-10-03

## 2018-10-03 DIAGNOSIS — C09.9 TONSIL CANCER (HCC): Primary | ICD-10-CM

## 2018-10-19 ENCOUNTER — HOSPITAL ENCOUNTER (OUTPATIENT)
Dept: PET IMAGING | Facility: HOSPITAL | Age: 53
Discharge: HOME | End: 2018-10-19
Attending: OTOLARYNGOLOGY | Admitting: OTOLARYNGOLOGY

## 2018-10-19 ENCOUNTER — HOSPITAL ENCOUNTER (OUTPATIENT)
Dept: PET IMAGING | Facility: HOSPITAL | Age: 53
Discharge: HOME | End: 2018-10-19
Attending: OTOLARYNGOLOGY

## 2018-10-19 DIAGNOSIS — C09.9 TONSIL CANCER (HCC): ICD-10-CM

## 2018-10-19 LAB — GLUCOSE BLDC GLUCOMTR-MCNC: 122 MG/DL (ref 70–130)

## 2018-10-19 PROCEDURE — 0 FLUDEOXYGLUCOSE F18 SOLUTION: Performed by: OTOLARYNGOLOGY

## 2018-10-19 PROCEDURE — A9552 F18 FDG: HCPCS | Performed by: OTOLARYNGOLOGY

## 2018-10-19 PROCEDURE — 78815 PET IMAGE W/CT SKULL-THIGH: CPT

## 2018-10-19 PROCEDURE — 82962 GLUCOSE BLOOD TEST: CPT

## 2018-10-19 RX ADMIN — FLUDEOXYGLUCOSE F18 1 DOSE: 300 INJECTION INTRAVENOUS at 09:15

## 2018-10-25 ENCOUNTER — OFFICE VISIT (OUTPATIENT)
Dept: ONCOLOGY | Facility: CLINIC | Age: 53
End: 2018-10-25

## 2018-10-25 VITALS
HEART RATE: 93 BPM | BODY MASS INDEX: 38.24 KG/M2 | HEIGHT: 74 IN | WEIGHT: 298 LBS | RESPIRATION RATE: 20 BRPM | SYSTOLIC BLOOD PRESSURE: 152 MMHG | TEMPERATURE: 98.4 F | DIASTOLIC BLOOD PRESSURE: 84 MMHG

## 2018-10-25 DIAGNOSIS — C09.9 SQUAMOUS CELL CARCINOMA OF LEFT TONSIL (HCC): Primary | ICD-10-CM

## 2018-10-25 PROCEDURE — 99213 OFFICE O/P EST LOW 20 MIN: CPT | Performed by: INTERNAL MEDICINE

## 2018-10-25 RX ORDER — LEVOTHYROXINE SODIUM 0.1 MG/1
TABLET ORAL
COMMUNITY
End: 2021-11-15 | Stop reason: SDUPTHER

## 2018-10-25 NOTE — PROGRESS NOTES
PROBLEM LIST:  Oncology/Hematology History     . 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   2. Severe allergic reaction to cetuximab. Pt did not tolerate cisplatin due to Severe Nausea  3. Completed IFRT on 5/25/16  4. Pet Scan 4 months post treatment 10/2016 - Negative       Squamous cell carcinoma of left tonsil     2/17/2016 Initial Diagnosis     Squamous cell carcinoma of left tonsil     4/4/2016 - 5/25/2016 Radiation     Radiation OncologyTreatment Course: Dr. Emery          REASON FOR VISIT: Follow-up of my head and neck cancer      Subjective     HISTORY OF PRESENT ILLNESS:   Mr. Richard is here for follow up evaluation of stage II squamous cell carcinoma of the left tonsil. He is 2.5  years from his diagnosis.  Doing clinically well.  He's gained all his weight back.  He was diagnosed with hypothyroidism which is expected due to the radiation.  Otherwise denies any dysphagia.  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  He is back to his pre-cancer weight .  He complains of severe fatigue.  and dyspnea with exertion.    Past Medical History, Past Surgical History, Social History, Family History have been reviewed and are without significant changes except as mentioned.    Review of Systems   Constitutional: Positive for fatigue. Negative for unexpected weight change.   HENT: Positive for hearing loss.    Eyes: Negative.    Respiratory: Negative.    Cardiovascular: Negative.    Gastrointestinal: Negative.    Endocrine: Negative.    Genitourinary: Negative.    Musculoskeletal: Negative.    Skin: Negative.    Allergic/Immunologic: Negative.    Neurological: Negative.    Hematological: Negative.    Psychiatric/Behavioral: Negative.       A comprehensive 14 point review of systems was performed and was negative except as mentioned.    Medications:  The current medication list was reviewed in the  "EMR    ALLERGIES:    Allergies   Allergen Reactions   • Adhesive Tape    • Bactrim [Sulfamethoxazole-Trimethoprim]    • Banana    • Eggs Or Egg-Derived Products    • Levofloxacin    • Peanut-Containing Drug Products    • Sulfa Antibiotics    • Wheat Extract        Objective      /84   Pulse 93   Temp 98.4 °F (36.9 °C)   Resp 20   Ht 188 cm (74\")   Wt 135 kg (298 lb)   BMI 38.26 kg/m²          General: well appearing, in no acute distress   HEENT: sclera anicteric, oropharynx clear, neck is supple  Lymphatics: no cervical, supraclavicular, or axillary adenopathy  Cardiovascular: regular rate and rhythm, no murmurs  Lungs: clear to auscultation bilaterally  Abdomen: soft, nontender, nondistended.  No palpable masses or organomegaly  Extremeties: no lower extremity edema, cords or calf tenderness  Skin: no rashes, lesions, bruising, or petechiae    Nm Pet Skull Base To Mid Thigh    Result Date: 10/19/2018  There has been no change since the previous examination of 12/05/2017 and there is no hypermetabolic abnormality. There is minimal uptake in a diffuse pattern in the left parapharyngeal region which in retrospect was present on the previous examination. This is unassociated with the mass and probably represents changes related to prior surgery/radiation or fat.  D:  10/19/2018 E:  10/19/2018  This report was finalized on 10/19/2018 12:01 PM by Dr. Didier John MD.      Assessment/Plan     1. stage II head and neck cancer.  His exam is fairly unrevealing today.   He had a PET scan with Dr. Wood  which was clear of recurrence.    Otherwise he needs to work on maintaining a healthy diet and exercise.     2. Radiation related Hypothyroidism: on replacement synthroid     I will see him back in 1 year            Kathleen Cervantes MD  [facility] Hematology and Oncology    10/25/2018    "

## 2018-12-27 ENCOUNTER — TELEPHONE (OUTPATIENT)
Dept: ONCOLOGY | Facility: CLINIC | Age: 53
End: 2018-12-27

## 2018-12-27 DIAGNOSIS — C09.9 SQUAMOUS CELL CARCINOMA OF LEFT TONSIL (HCC): Primary | ICD-10-CM

## 2018-12-27 NOTE — TELEPHONE ENCOUNTER
----- Message from Jena Cornell sent at 12/27/2018  9:11 AM EST -----  Regarding: [facility] - LESION FOUND ON CT SCAN   Contact: [phone]  CHRIS, SPOUSE CALLED AND SAID PATIENT WAS SEEN IN THE [facility] EMERGENCY ROOM LAST NIGHT BECAUSE HE WAS HAVING PAIN, AND HE THOUGHT HE WAS HAVING A HEART ATTACK, BUT THEY TOLD HIM HE HAS GALLSTONES, AND THEY DID CT SCAN.     THEY FOUND A LESION ON HIS ADRENAL GLAND.     THEY WANTED HIM TO FOLLOW UP WITH DR RANKIN AND GET AN MRI.

## 2018-12-30 ENCOUNTER — HOSPITAL ENCOUNTER (OUTPATIENT)
Dept: MRI IMAGING | Facility: HOSPITAL | Age: 53
Discharge: HOME | End: 2018-12-30
Attending: INTERNAL MEDICINE | Admitting: INTERNAL MEDICINE

## 2018-12-30 DIAGNOSIS — C09.9 SQUAMOUS CELL CARCINOMA OF LEFT TONSIL (HCC): ICD-10-CM

## 2018-12-30 PROCEDURE — A9577 INJ MULTIHANCE: HCPCS | Performed by: INTERNAL MEDICINE

## 2018-12-30 PROCEDURE — 0 GADOBENATE DIMEGLUMINE 529 MG/ML SOLUTION: Performed by: INTERNAL MEDICINE

## 2018-12-30 PROCEDURE — 74183 MRI ABD W/O CNTR FLWD CNTR: CPT

## 2018-12-30 RX ADMIN — GADOBENATE DIMEGLUMINE 20 ML: 529 INJECTION, SOLUTION INTRAVENOUS at 14:00

## 2018-12-31 ENCOUNTER — TELEPHONE (OUTPATIENT)
Dept: ONCOLOGY | Facility: CLINIC | Age: 53
End: 2018-12-31

## 2018-12-31 NOTE — TELEPHONE ENCOUNTER
----- Message from Sirisha Li sent at 12/31/2018 11:30 AM EST -----  Regarding: [facility] - MRI RESULTS  Contact: [phone]  PATIENT CALLED IN TO SEE IF RESULTS FROM HIS MRI WERE IN YET.

## 2018-12-31 NOTE — TELEPHONE ENCOUNTER
Called patient and informed him per Dr. Cordero his MRI had showed no cancer. I informed him that the scans looked the same as 3 years ago and the spot on the adrenal gland was fine. Patient and wife stated they understood.

## 2019-02-15 ENCOUNTER — APPOINTMENT (OUTPATIENT)
Dept: PREADMISSION TESTING | Facility: HOSPITAL | Age: 54
End: 2019-02-15

## 2019-03-15 ENCOUNTER — APPOINTMENT (OUTPATIENT)
Dept: PREADMISSION TESTING | Facility: HOSPITAL | Age: 54
End: 2019-03-15

## 2019-03-15 VITALS — HEIGHT: 74 IN | BODY MASS INDEX: 38.37 KG/M2 | WEIGHT: 298.94 LBS

## 2019-03-15 LAB
ALBUMIN SERPL-MCNC: 4.4 G/DL (ref 3.2–4.8)
ALBUMIN/GLOB SERPL: 2.2 G/DL (ref 1.5–2.5)
ALP SERPL-CCNC: 87 U/L (ref 25–100)
ALT SERPL W P-5'-P-CCNC: 50 U/L (ref 7–40)
ANION GAP SERPL CALCULATED.3IONS-SCNC: 5 MMOL/L (ref 3–11)
AST SERPL-CCNC: 24 U/L (ref 0–33)
BILIRUB SERPL-MCNC: 0.3 MG/DL (ref 0.3–1.2)
BUN BLD-MCNC: 23 MG/DL (ref 9–23)
BUN/CREAT SERPL: 25.6 (ref 7–25)
CALCIUM SPEC-SCNC: 8.8 MG/DL (ref 8.7–10.4)
CHLORIDE SERPL-SCNC: 108 MMOL/L (ref 99–109)
CO2 SERPL-SCNC: 25 MMOL/L (ref 20–31)
CREAT BLD-MCNC: 0.9 MG/DL (ref 0.6–1.3)
DEPRECATED RDW RBC AUTO: 45.5 FL (ref 37–54)
ERYTHROCYTE [DISTWIDTH] IN BLOOD BY AUTOMATED COUNT: 12.6 % (ref 11.3–14.5)
GFR SERPL CREATININE-BSD FRML MDRD: 89 ML/MIN/1.73
GLOBULIN UR ELPH-MCNC: 2 GM/DL
GLUCOSE BLD-MCNC: 127 MG/DL (ref 70–100)
HCT VFR BLD AUTO: 43.3 % (ref 38.9–50.9)
HGB BLD-MCNC: 14.3 G/DL (ref 13.1–17.5)
MCH RBC QN AUTO: 32.6 PG (ref 27–31)
MCHC RBC AUTO-ENTMCNC: 33 G/DL (ref 32–36)
MCV RBC AUTO: 98.9 FL (ref 80–99)
PLATELET # BLD AUTO: 178 10*3/MM3 (ref 150–450)
PMV BLD AUTO: 10.7 FL (ref 6–12)
POTASSIUM BLD-SCNC: 4.1 MMOL/L (ref 3.5–5.5)
PROT SERPL-MCNC: 6.4 G/DL (ref 5.7–8.2)
RBC # BLD AUTO: 4.38 10*6/MM3 (ref 4.2–5.76)
SODIUM BLD-SCNC: 138 MMOL/L (ref 132–146)
WBC NRBC COR # BLD: 6.55 10*3/MM3 (ref 3.5–10.8)

## 2019-03-15 PROCEDURE — 80053 COMPREHEN METABOLIC PANEL: CPT | Performed by: SURGERY

## 2019-03-15 PROCEDURE — 36415 COLL VENOUS BLD VENIPUNCTURE: CPT

## 2019-03-15 PROCEDURE — 85027 COMPLETE CBC AUTOMATED: CPT | Performed by: SURGERY

## 2019-03-15 RX ORDER — CEFAZOLIN SODIUM 2 G/100ML
2 INJECTION, SOLUTION INTRAVENOUS ONCE
Status: DISCONTINUED | OUTPATIENT
Start: 2019-03-19 | End: 2019-03-15

## 2019-03-15 RX ORDER — OMEPRAZOLE 20 MG/1
20 CAPSULE, DELAYED RELEASE ORAL DAILY
COMMUNITY
End: 2021-08-26

## 2019-03-18 ENCOUNTER — ANESTHESIA EVENT (OUTPATIENT)
Dept: PERIOP | Facility: HOSPITAL | Age: 54
End: 2019-03-18

## 2019-03-18 RX ORDER — FAMOTIDINE 10 MG/ML
20 INJECTION, SOLUTION INTRAVENOUS ONCE
Status: CANCELLED | OUTPATIENT
Start: 2019-03-18 | End: 2019-03-18

## 2019-03-19 ENCOUNTER — APPOINTMENT (OUTPATIENT)
Dept: GENERAL RADIOLOGY | Facility: HOSPITAL | Age: 54
End: 2019-03-19

## 2019-03-19 ENCOUNTER — ANESTHESIA (OUTPATIENT)
Dept: PERIOP | Facility: HOSPITAL | Age: 54
End: 2019-03-19

## 2019-03-19 ENCOUNTER — HOSPITAL ENCOUNTER (OUTPATIENT)
Facility: HOSPITAL | Age: 54
Setting detail: HOSPITAL OUTPATIENT SURGERY
Discharge: HOME | End: 2019-03-19
Attending: SURGERY | Admitting: SURGERY

## 2019-03-19 VITALS
RESPIRATION RATE: 12 BRPM | DIASTOLIC BLOOD PRESSURE: 90 MMHG | WEIGHT: 298 LBS | SYSTOLIC BLOOD PRESSURE: 121 MMHG | HEART RATE: 85 BPM | HEIGHT: 74 IN | OXYGEN SATURATION: 92 % | BODY MASS INDEX: 38.24 KG/M2 | TEMPERATURE: 97.3 F

## 2019-03-19 DIAGNOSIS — K80.20 CHOLELITHIASIS: ICD-10-CM

## 2019-03-19 PROBLEM — E66.01 MORBID OBESITY (HCC): Status: ACTIVE | Noted: 2019-03-19

## 2019-03-19 PROCEDURE — 25010000002 ONDANSETRON PER 1 MG: Performed by: NURSE ANESTHETIST, CERTIFIED REGISTERED

## 2019-03-19 PROCEDURE — 25010000002 NEOSTIGMINE 10 MG/10ML SOLUTION: Performed by: NURSE ANESTHETIST, CERTIFIED REGISTERED

## 2019-03-19 PROCEDURE — 25010000002 IOPAMIDOL 61 % SOLUTION: Performed by: SURGERY

## 2019-03-19 PROCEDURE — 25010000002 HYDROMORPHONE PER 4 MG: Performed by: NURSE ANESTHETIST, CERTIFIED REGISTERED

## 2019-03-19 PROCEDURE — 25010000003 CEFAZOLIN IN DEXTROSE 2-4 GM/100ML-% SOLUTION: Performed by: SURGERY

## 2019-03-19 PROCEDURE — 25010000002 FENTANYL CITRATE (PF) 100 MCG/2ML SOLUTION: Performed by: NURSE ANESTHETIST, CERTIFIED REGISTERED

## 2019-03-19 PROCEDURE — 25010000002 PROPOFOL 1000 MG/ML EMULSION: Performed by: NURSE ANESTHETIST, CERTIFIED REGISTERED

## 2019-03-19 PROCEDURE — 88304 TISSUE EXAM BY PATHOLOGIST: CPT | Performed by: SURGERY

## 2019-03-19 PROCEDURE — 25010000002 PROMETHAZINE PER 50 MG: Performed by: NURSE ANESTHETIST, CERTIFIED REGISTERED

## 2019-03-19 PROCEDURE — 74300 X-RAY BILE DUCTS/PANCREAS: CPT

## 2019-03-19 PROCEDURE — 25010000002 PHENYLEPHRINE PER 1 ML: Performed by: NURSE ANESTHETIST, CERTIFIED REGISTERED

## 2019-03-19 PROCEDURE — 25010000002 PROPOFOL 10 MG/ML EMULSION: Performed by: NURSE ANESTHETIST, CERTIFIED REGISTERED

## 2019-03-19 RX ORDER — PROPOFOL 10 MG/ML
VIAL (ML) INTRAVENOUS AS NEEDED
Status: DISCONTINUED | OUTPATIENT
Start: 2019-03-19 | End: 2019-03-19 | Stop reason: SURG

## 2019-03-19 RX ORDER — HYDRALAZINE HYDROCHLORIDE 20 MG/ML
5 INJECTION INTRAMUSCULAR; INTRAVENOUS
Status: DISCONTINUED | OUTPATIENT
Start: 2019-03-19 | End: 2019-03-19 | Stop reason: HOSPADM

## 2019-03-19 RX ORDER — CEFAZOLIN SODIUM 2 G/100ML
2 INJECTION, SOLUTION INTRAVENOUS ONCE
Status: COMPLETED | OUTPATIENT
Start: 2019-03-19 | End: 2019-03-19

## 2019-03-19 RX ORDER — PROMETHAZINE HYDROCHLORIDE 12.5 MG/1
12.5 TABLET ORAL ONCE AS NEEDED
Status: DISCONTINUED | OUTPATIENT
Start: 2019-03-19 | End: 2019-03-19 | Stop reason: HOSPADM

## 2019-03-19 RX ORDER — SODIUM CHLORIDE 0.9 % (FLUSH) 0.9 %
3 SYRINGE (ML) INJECTION EVERY 12 HOURS SCHEDULED
Status: DISCONTINUED | OUTPATIENT
Start: 2019-03-19 | End: 2019-03-19 | Stop reason: HOSPADM

## 2019-03-19 RX ORDER — PROMETHAZINE HYDROCHLORIDE 25 MG/ML
6.25 INJECTION, SOLUTION INTRAMUSCULAR; INTRAVENOUS ONCE AS NEEDED
Status: COMPLETED | OUTPATIENT
Start: 2019-03-19 | End: 2019-03-19

## 2019-03-19 RX ORDER — SIMETHICONE 80 MG
80 TABLET,CHEWABLE ORAL ONCE
Status: COMPLETED | OUTPATIENT
Start: 2019-03-19 | End: 2019-03-19

## 2019-03-19 RX ORDER — SODIUM CHLORIDE, SODIUM LACTATE, POTASSIUM CHLORIDE, CALCIUM CHLORIDE 600; 310; 30; 20 MG/100ML; MG/100ML; MG/100ML; MG/100ML
9 INJECTION, SOLUTION INTRAVENOUS CONTINUOUS
Status: DISCONTINUED | OUTPATIENT
Start: 2019-03-19 | End: 2019-03-19 | Stop reason: HOSPADM

## 2019-03-19 RX ORDER — ONDANSETRON 2 MG/ML
INJECTION INTRAMUSCULAR; INTRAVENOUS AS NEEDED
Status: DISCONTINUED | OUTPATIENT
Start: 2019-03-19 | End: 2019-03-19 | Stop reason: SURG

## 2019-03-19 RX ORDER — PROMETHAZINE HYDROCHLORIDE 25 MG/1
25 TABLET ORAL ONCE AS NEEDED
Status: COMPLETED | OUTPATIENT
Start: 2019-03-19 | End: 2019-03-19

## 2019-03-19 RX ORDER — GLYCOPYRROLATE 0.2 MG/ML
INJECTION INTRAMUSCULAR; INTRAVENOUS AS NEEDED
Status: DISCONTINUED | OUTPATIENT
Start: 2019-03-19 | End: 2019-03-19 | Stop reason: SURG

## 2019-03-19 RX ORDER — NEOSTIGMINE METHYLSULFATE 1 MG/ML
INJECTION, SOLUTION INTRAVENOUS AS NEEDED
Status: DISCONTINUED | OUTPATIENT
Start: 2019-03-19 | End: 2019-03-19 | Stop reason: SURG

## 2019-03-19 RX ORDER — FENTANYL CITRATE 50 UG/ML
INJECTION, SOLUTION INTRAMUSCULAR; INTRAVENOUS AS NEEDED
Status: DISCONTINUED | OUTPATIENT
Start: 2019-03-19 | End: 2019-03-19 | Stop reason: SURG

## 2019-03-19 RX ORDER — EPHEDRINE SULFATE 50 MG/ML
5 INJECTION, SOLUTION INTRAVENOUS ONCE AS NEEDED
Status: DISCONTINUED | OUTPATIENT
Start: 2019-03-19 | End: 2019-03-19 | Stop reason: HOSPADM

## 2019-03-19 RX ORDER — LIDOCAINE HYDROCHLORIDE 10 MG/ML
0.5 INJECTION, SOLUTION EPIDURAL; INFILTRATION; INTRACAUDAL; PERINEURAL ONCE AS NEEDED
Status: COMPLETED | OUTPATIENT
Start: 2019-03-19 | End: 2019-03-19

## 2019-03-19 RX ORDER — ONDANSETRON 2 MG/ML
4 INJECTION INTRAMUSCULAR; INTRAVENOUS ONCE AS NEEDED
Status: COMPLETED | OUTPATIENT
Start: 2019-03-19 | End: 2019-03-19

## 2019-03-19 RX ORDER — DOCUSATE SODIUM 250 MG
250 CAPSULE ORAL 2 TIMES DAILY
Qty: 20 CAPSULE | Refills: 0 | Status: SHIPPED | OUTPATIENT
Start: 2019-03-19 | End: 2021-08-26

## 2019-03-19 RX ORDER — ALBUTEROL SULFATE 2.5 MG/3ML
2.5 SOLUTION RESPIRATORY (INHALATION) ONCE AS NEEDED
Status: DISCONTINUED | OUTPATIENT
Start: 2019-03-19 | End: 2019-03-19 | Stop reason: HOSPADM

## 2019-03-19 RX ORDER — HYDROMORPHONE HYDROCHLORIDE 1 MG/ML
0.5 INJECTION, SOLUTION INTRAMUSCULAR; INTRAVENOUS; SUBCUTANEOUS
Status: DISCONTINUED | OUTPATIENT
Start: 2019-03-19 | End: 2019-03-19 | Stop reason: HOSPADM

## 2019-03-19 RX ORDER — FAMOTIDINE 20 MG/1
20 TABLET, FILM COATED ORAL ONCE
Status: COMPLETED | OUTPATIENT
Start: 2019-03-19 | End: 2019-03-19

## 2019-03-19 RX ORDER — SODIUM CHLORIDE 9 MG/ML
INJECTION, SOLUTION INTRAVENOUS AS NEEDED
Status: DISCONTINUED | OUTPATIENT
Start: 2019-03-19 | End: 2019-03-19 | Stop reason: HOSPADM

## 2019-03-19 RX ORDER — OXYCODONE HYDROCHLORIDE AND ACETAMINOPHEN 5; 325 MG/1; MG/1
1-2 TABLET ORAL EVERY 4 HOURS PRN
Qty: 17 TABLET | Refills: 0 | Status: SHIPPED | OUTPATIENT
Start: 2019-03-19 | End: 2021-08-26

## 2019-03-19 RX ORDER — LIDOCAINE HYDROCHLORIDE 10 MG/ML
INJECTION, SOLUTION EPIDURAL; INFILTRATION; INTRACAUDAL; PERINEURAL AS NEEDED
Status: DISCONTINUED | OUTPATIENT
Start: 2019-03-19 | End: 2019-03-19 | Stop reason: SURG

## 2019-03-19 RX ORDER — LABETALOL HYDROCHLORIDE 5 MG/ML
5 INJECTION, SOLUTION INTRAVENOUS
Status: DISCONTINUED | OUTPATIENT
Start: 2019-03-19 | End: 2019-03-19 | Stop reason: HOSPADM

## 2019-03-19 RX ORDER — ATRACURIUM BESYLATE 10 MG/ML
INJECTION, SOLUTION INTRAVENOUS AS NEEDED
Status: DISCONTINUED | OUTPATIENT
Start: 2019-03-19 | End: 2019-03-19 | Stop reason: SURG

## 2019-03-19 RX ORDER — SODIUM CHLORIDE 0.9 % (FLUSH) 0.9 %
3-10 SYRINGE (ML) INJECTION AS NEEDED
Status: DISCONTINUED | OUTPATIENT
Start: 2019-03-19 | End: 2019-03-19 | Stop reason: HOSPADM

## 2019-03-19 RX ORDER — PROMETHAZINE HYDROCHLORIDE 25 MG/1
25 SUPPOSITORY RECTAL ONCE AS NEEDED
Status: COMPLETED | OUTPATIENT
Start: 2019-03-19 | End: 2019-03-19

## 2019-03-19 RX ORDER — DOCUSATE SODIUM 100 MG/1
100 CAPSULE, LIQUID FILLED ORAL ONCE
Status: CANCELLED | OUTPATIENT
Start: 2019-03-19 | End: 2019-03-19

## 2019-03-19 RX ORDER — BUPIVACAINE HYDROCHLORIDE AND EPINEPHRINE 2.5; 5 MG/ML; UG/ML
INJECTION, SOLUTION EPIDURAL; INFILTRATION; INTRACAUDAL; PERINEURAL AS NEEDED
Status: DISCONTINUED | OUTPATIENT
Start: 2019-03-19 | End: 2019-03-19 | Stop reason: HOSPADM

## 2019-03-19 RX ORDER — FENTANYL CITRATE 50 UG/ML
50 INJECTION, SOLUTION INTRAMUSCULAR; INTRAVENOUS
Status: DISCONTINUED | OUTPATIENT
Start: 2019-03-19 | End: 2019-03-19 | Stop reason: HOSPADM

## 2019-03-19 RX ADMIN — ONDANSETRON 4 MG: 2 INJECTION INTRAMUSCULAR; INTRAVENOUS at 09:20

## 2019-03-19 RX ADMIN — GLYCOPYRROLATE 0.8 MG: 0.2 INJECTION, SOLUTION INTRAMUSCULAR; INTRAVENOUS at 08:48

## 2019-03-19 RX ADMIN — HYDROMORPHONE HYDROCHLORIDE 0.5 MG: 1 INJECTION, SOLUTION INTRAMUSCULAR; INTRAVENOUS; SUBCUTANEOUS at 09:39

## 2019-03-19 RX ADMIN — FAMOTIDINE 20 MG: 20 TABLET ORAL at 07:15

## 2019-03-19 RX ADMIN — PHENYLEPHRINE HYDROCHLORIDE 80 MCG: 10 INJECTION INTRAVENOUS at 08:12

## 2019-03-19 RX ADMIN — SIMETHICONE CHEW TAB 80 MG 80 MG: 80 TABLET ORAL at 09:33

## 2019-03-19 RX ADMIN — PROPOFOL 25 MCG/KG/MIN: 10 INJECTION, EMULSION INTRAVENOUS at 07:55

## 2019-03-19 RX ADMIN — FENTANYL CITRATE 50 MCG: 50 INJECTION, SOLUTION INTRAMUSCULAR; INTRAVENOUS at 07:51

## 2019-03-19 RX ADMIN — ATRACURIUM BESYLATE 40 MG: 10 INJECTION, SOLUTION INTRAVENOUS at 07:51

## 2019-03-19 RX ADMIN — HYDROMORPHONE HYDROCHLORIDE 0.5 MG: 1 INJECTION, SOLUTION INTRAMUSCULAR; INTRAVENOUS; SUBCUTANEOUS at 10:19

## 2019-03-19 RX ADMIN — CEFAZOLIN SODIUM 3 G: 2 INJECTION, SOLUTION INTRAVENOUS at 07:55

## 2019-03-19 RX ADMIN — ONDANSETRON 4 MG: 2 INJECTION INTRAMUSCULAR; INTRAVENOUS at 08:34

## 2019-03-19 RX ADMIN — NEOSTIGMINE METHYLSULFATE 4 MG: 1 INJECTION, SOLUTION INTRAVENOUS at 08:48

## 2019-03-19 RX ADMIN — PROMETHAZINE HYDROCHLORIDE 6.25 MG: 25 INJECTION INTRAMUSCULAR; INTRAVENOUS at 09:29

## 2019-03-19 RX ADMIN — LIDOCAINE HYDROCHLORIDE 50 MG: 10 INJECTION, SOLUTION EPIDURAL; INFILTRATION; INTRACAUDAL; PERINEURAL at 07:51

## 2019-03-19 RX ADMIN — LIDOCAINE HYDROCHLORIDE 0.5 ML: 10 INJECTION, SOLUTION EPIDURAL; INFILTRATION; INTRACAUDAL; PERINEURAL at 07:15

## 2019-03-19 RX ADMIN — SODIUM CHLORIDE, POTASSIUM CHLORIDE, SODIUM LACTATE AND CALCIUM CHLORIDE 9 ML/HR: 600; 310; 30; 20 INJECTION, SOLUTION INTRAVENOUS at 07:16

## 2019-03-19 RX ADMIN — FENTANYL CITRATE 50 MCG: 50 INJECTION, SOLUTION INTRAMUSCULAR; INTRAVENOUS at 09:05

## 2019-03-19 RX ADMIN — PROPOFOL 200 MG: 10 INJECTION, EMULSION INTRAVENOUS at 07:51

## 2019-03-19 NOTE — ANESTHESIA PREPROCEDURE EVALUATION
Anesthesia Evaluation                  Airway   Mallampati: II  Dental      Pulmonary    (+) shortness of breath,   Cardiovascular         Neuro/Psych  GI/Hepatic/Renal/Endo    (+) obesity,  GERD,  hypothyroidism,     Musculoskeletal     Abdominal    Substance History      OB/GYN          Other                        Anesthesia Plan    ASA 3     general     intravenous induction   Anesthetic plan, all risks, benefits, and alternatives have been provided, discussed and informed consent has been obtained with: patient.

## 2019-03-19 NOTE — H&P
Pre-Op H&P  Ken Richard  [id number]  1965    Chief complaint: gastric pain    HPI:    Patient is a 53 y.o.male who presents with gallbladder attack 12/26/2018. He has had mild stomach pain since that time.     Review of Systems:  General ROS: negative for chills, fever or skin lesions;  No changes since last office visit  Cardiovascular ROS: no chest pain or dyspnea on exertion  Respiratory ROS: no cough, shortness of breath, or wheezing    Allergies:   Allergies   Allergen Reactions   • Adhesive Tape Other (See Comments)     Paper tape is good but other tape peels skin off   • Banana GI Intolerance   • Eggs Or Egg-Derived Products GI Intolerance   • Peanut-Containing Drug Products GI Intolerance   • Sulfa Antibiotics Other (See Comments)     Unsure reaction- not sure if actually allergic    • Wheat Extract GI Intolerance   • Bactrim [Sulfamethoxazole-Trimethoprim] Swelling and Rash   • Levofloxacin Swelling and Rash       Home Meds:    No current facility-administered medications on file prior to encounter.      Current Outpatient Medications on File Prior to Encounter   Medication Sig Dispense Refill   • levothyroxine (SYNTHROID, LEVOTHROID) 100 MCG tablet levothyroxine 100 mcg tablet   Take 1 tablet every day by oral route.     • Multiple Vitamins-Minerals (MULTIVITAMIN ADULT PO) Take 1 tablet by mouth Daily.     • PROAIR  (90 Base) MCG/ACT inhaler Inhale 1 puff Every 4 (Four) Hours As Needed for Wheezing or Shortness of Air.     • SYMBICORT 160-4.5 MCG/ACT inhaler Inhale 3 puffs 2 (Two) Times a Day.         PMH:   Past Medical History:   Diagnosis Date   • Achilles tendinitis    • Allergic reaction     Severe allergic reaction to cetuximab. The patient has been switched to weekly cisplatin.   • Chills    • Cough    • Difficulty breathing    • Difficulty swallowing    • Dizziness     when he stands   • Eating problem     inability to eat   • Fatigue    • Fever    • Gallstones    • GERD  (gastroesophageal reflux disease)    • Headache    • Headache    • Hearing aid worn     bilat    • Hearing loss     Baseline hearing loss   • Heartburn    • History of deafness    • History of kidney stones    • History of radiation therapy    • Hoarseness    • Joint pain    • Nausea     Persistent intractable nausea resulting in a 60 pound weight loss since initiation of radiotherapy and daily requirement for IV fluids   • Obesity    • Osteoarthritis     all over   • Ringing in ears     ongoing for many years and he does wear hearing aids   • Seasonal allergies    • Severe nausea     related to cisplatin; we will discontinue cisplatin and continue with radiation   • Sinus congestion    • Sinusitis    • SOBOE (shortness of breath on exertion)    • Spinal stenosis at L4-L5 level    • Squamous cell carcinoma of left tonsil (CMS/[facility])     Stage II (T2N0M0); with margin that is positive for disease at time of resection on 03/02/2016   • Tinnitus    • Weak     Clincally he has gotten increasingly weaker.   • Wears glasses    • Weight loss     Persistent intractable nausea resulting in a 60 pound weight loss since initiation of radiotherapy and daily requirement for IV fluids  He has lost almost approximately 60 pounds; he started at 298 pounds, and he is down to 240 punds at this point. He is requiring daily fluid infusions to maintain hydration status.     PSH:    Past Surgical History:   Procedure Laterality Date   • COLONOSCOPY     • GASTROSTOMY FEEDING TUBE INSERTION  06/2016    @ [facility] with Dr. Thompson   • KNEE SURGERY      Two knee surgeries.- torn meniscus bilat    • TONSILLECTOMY  03/02/2016    left removed    • VASECTOMY         Immunization History:  Influenza: no  Pneumococcal: no  Tetanus: no    Social History:   Tobacco:   Social History     Tobacco Use   Smoking Status Former Smoker   • Packs/day: 1.00   • Years: 25.00   • Pack years: 25.00   • Types: Cigarettes   • Last attempt to quit: 2012   • Years since  quittin.2   Smokeless Tobacco Former User   • Types: Chew   • Quit date:       Alcohol:     Social History     Substance and Sexual Activity   Alcohol Use Yes    Comment: Occasionally-2-3 beers q few mo       Vitals:           There were no vitals taken for this visit.    Physical Exam:  General Appearance:    Alert, cooperative, no distress, appears stated age   Head:    Normocephalic, without obvious abnormality, atraumatic   Lungs:     Clear to auscultation bilaterally, respirations unlabored    Heart:   Regular rate and rhythm, S1 and S2 normal, no murmur, rub    or gallop    Abdomen:    Soft, non-tender.  +bowel sounds   Breast Exam:    deferred   Genitalia:    deferred   Extremities:   Extremities normal, atraumatic, no cyanosis or edema   Skin:   Skin color, texture, turgor normal, no rashes or lesions   Neurologic:   Grossly intact   Results Review  I reviewed the patient's new clinical results.    Cancer Staging (if applicable)  Cancer Patient: __ yes __no __unknown; If yes, clinical stage T:__ N:__M:__, stage group or __N/A    Impression: 53 year old gentleman presenting with gastric pain    Plan: laparoscopic cholecystectomy    JULIO Doe   3/19/2019   7:11 AM

## 2019-03-19 NOTE — ANESTHESIA POSTPROCEDURE EVALUATION
Patient: Ken Richard    Procedure Summary     Date:  03/19/19 Room / Location:   [facility]  /  [facility]    Anesthesia Start:  0745 Anesthesia Stop:      Procedure:  CHOLECYSTECTOMY LAPAROSCOPIC INTRAOPERATIVE CHOLANGIOGRAM (N/A Abdomen) Diagnosis:      Surgeon:  Humberto Thompson MD Provider:  Didier Tapia MD    Anesthesia Type:  general ASA Status:  3          Anesthesia Type: general  Last vitals  BP   133/91 (03/19/19 0711) 140/78   Temp   97.9 °F (36.6 °C) (03/19/19 0711) 97.1   Pulse   78 (03/19/19 0711) 89   Resp   18 (03/19/19 0711)  15   SpO2   96 % (03/19/19 0711) 95%     Post Anesthesia Care and Evaluation    Patient location during evaluation: PACU  Patient participation: complete - patient participated  Level of consciousness: awake and alert  Pain score: 0  Pain management: adequate  Airway patency: patent  Anesthetic complications: No anesthetic complications  PONV Status: none  Cardiovascular status: hemodynamically stable and acceptable  Respiratory status: nonlabored ventilation, acceptable, nasal cannula and oral airway  Hydration status: acceptable

## 2019-03-19 NOTE — BRIEF OP NOTE
CHOLECYSTECTOMY LAPAROSCOPIC INTRAOPERATIVE CHOLANGIOGRAM  Progress Note    Ken Richard  3/19/2019    Pre-op Diagnosis:   Symptomatic cholelithiasis       Post-Op Diagnosis Codes:   Same    Procedure/CPT® Codes:      Procedure(s):  CHOLECYSTECTOMY LAPAROSCOPIC INTRAOPERATIVE CHOLANGIOGRAM    Surgeon(s):  Humberto Tohmpson MD    Anesthesia: General    Staff:   Circulator: Julieta Adkins RN  Scrub Person: Misha Chopra  Nursing Assistant: Yanira Zayas    Estimated Blood Loss: minimal    Urine Voided: 0 mL    Specimens:                Specimens     ID Source Type Tests Collected By Collected At Frozen?      A Gallbladder Tissue · TISSUE PATHOLOGY EXAM   Humberto Thompson MD 3/19/19 0814 No     Description: GALLBLADDER                Drains:      Findings: IOC (-)    Complications: None      Humberto Thompson MD     Date: 3/19/2019  Time: 8:49 AM

## 2019-03-19 NOTE — OP NOTE
Operative Report    Patient Name:  Ken Richard  YOB: 1965  [id number]  3/19/2019      PREOPERATIVE DIAGNOSIS: Symptomatic cholelithiasis      POSTOPERATIVE DIAGNOSIS: Same        PROCEDURE PERFORMED:     Laparoscopic cholecystectomy with intra-operative cholangiography        SURGEON: Humberto Thompson MD      ASSISTANT: Bobo Morel MD       SPECIMENS: Gallbladder and contents        ANESTHESIA: General.        FINDINGS:     1. Gallbladder in standard positioning     2. Intra-operative cholangiogram demonstrated excellent filling of the cystic, common, right and left hepatic ducts with good flow of contrast into the duodenum without retained stone or filling defect        INDICATIONS:      The patient is a 53 y.o. male with a history of abdominal pain, concerning for symptomatic cholelithiasis. Pre-operative imaging including U/S confirmed the diagnosis. The risks and benefits of Laparoscopic cholecystectomy with cholangiography were discussed with the patient and their family and they agree to proceed.       DESCRIPTION OF PROCEDURE:     After obtaining informed consent, the patient was taken to the operating room and placed in the supine position. After appropriate DVT and antibiotic prophylaxis, general anesthesia was induced. The abdomen was prepped and draped in standard sterile fashion, and after infiltrating the skin with local anesthetic, a 12mm skin incision was made inferior  to the right costal margin.  And optically guided trocar was advanced without difficulty into the abdominal cavity. The abdomen was insufflated with carbon dioxide gas to a pressure of 15mm Hg, and a laparoscope advanced through the trocar and the abdominal contents were inspected. There was no evidence of bowel, bladder, or visceral entry with entrance of the trocar. At this point, after infiltrating the skin with local anesthetic, a standard laparoscopic cholecystectomy trocar placement schema was  "followed.     The gallbladder was grasped and elevated superiorly. Using meticulous blunt dissection , the cystic duct and cystic artery were bluntly dissected free of other structures and clearly identified using the \"Critical View\" technique. The cystic artery was then clipped twice proximally and once distally, and divided between the clips.     The cystic duct was then clipped at its junction with the infundibulum of the gallbladder, and transected across 50% of its circumference. A cholangiogram catheter was then placed within the duct, and on-table cholangiography under fluoroscopy was obtained. There was excellent filling of the cystic, common, right and left hepatic ducts with good flow of contrast into the duodenum without retained stone or filling defect  . The cholangiogram catheter was then removed, and the cystic duct was ligated using a 2-0 silk suture tied laparoscopically,  reinforced with hemoclips, and divided.     The gallbladder was then dissected free of the gallbladder fossa using a combination of electrocautery and blunt dissection . There was a small posterior branch of the artery that was clipped and divided . The gallbladder was then placed in an Endo Catch bag, and removed from the 12mm subcostal trocar site. It was inspected on the back table, correlated with intra-operative findings, and passed off as specimen.     The right upper quadrant was then inspected. The cystic duct and cystic artery stumps were intact without bleeding or biliary leak. The right upper quadrant was irrigated with saline until clear. The abdomen was deflated and reinsufflated to make sure pneumoperitoneum was not tamponading any bleeding and there was none.  Using a 0 Vicryl suture, and the laparoscopic suture passer, a figure-of-eight suture was placed around the 12 mm subcostal trocar site fascia.  It was then infiltrated with local anesthetic.  The abdomen was again irrigated with saline until clear, and all " trocars removed under direct and laparoscopic visualization. The fascia at the 12mm subcostal incision was closed using the previously placed 0 Vicryl suture. The wounds were irrigated with normal saline, and closed in each area using absorbable subcuticular suture. The incisions were dressed in standard sterile fashion and covered with dry dressings. The patient recovered from anesthesia, was extubated in the operating room, and transferred to the PACU in stable condition.  All sponge and needle counts were correct times two at the completion of the procedure. There were no immediate complications.       Humberto Thompson MD  3/19/2019  8:49 AM

## 2019-03-19 NOTE — ANESTHESIA PROCEDURE NOTES
Airway  Urgency: elective    Airway not difficult    General Information and Staff    Patient location during procedure: OR  CRNA: Daisy Solares CRNA    Indications and Patient Condition  Indications for airway management: airway protection    Preoxygenated: yes  MILS not maintained throughout  Mask difficulty assessment: 2 - vent by mask + OA or adjuvant +/- NMBA    Final Airway Details  Final airway type: endotracheal airway      Successful airway: ETT  Cuffed: yes   Successful intubation technique: video laryngoscopy  Endotracheal tube insertion site: oral  Blade: Abhishek  Blade size: 4  ETT size (mm): 7.5  Cormack-Lehane Classification: grade I - full view of glottis  Placement verified by: chest auscultation and capnometry   Cuff volume (mL): 9  Measured from: lips  ETT to lips (cm): 23  Number of attempts at approach: 1    Additional Comments  Negative epigastric sounds, Breath sound equal bilaterally with symmetric chest rise and fall

## 2019-03-20 LAB
CYTO UR: NORMAL
LAB AP CASE REPORT: NORMAL
LAB AP CLINICAL INFORMATION: NORMAL
PATH REPORT.FINAL DX SPEC: NORMAL
PATH REPORT.GROSS SPEC: NORMAL

## 2019-10-01 ENCOUNTER — TRANSCRIBE ORDERS (OUTPATIENT)
Dept: ADMINISTRATIVE | Facility: HOSPITAL | Age: 54
End: 2019-10-01

## 2019-10-01 DIAGNOSIS — C09.9 CANCER OF TONSIL (HCC): Primary | ICD-10-CM

## 2019-10-10 ENCOUNTER — HOSPITAL ENCOUNTER (OUTPATIENT)
Dept: PET IMAGING | Facility: HOSPITAL | Age: 54
Discharge: HOME | End: 2019-10-10
Admitting: OTOLARYNGOLOGY

## 2019-10-10 ENCOUNTER — HOSPITAL ENCOUNTER (OUTPATIENT)
Dept: PET IMAGING | Facility: HOSPITAL | Age: 54
Discharge: HOME | End: 2019-10-10

## 2019-10-10 DIAGNOSIS — C09.9 CANCER OF TONSIL (HCC): ICD-10-CM

## 2019-10-10 LAB — GLUCOSE BLDC GLUCOMTR-MCNC: 98 MG/DL (ref 70–130)

## 2019-10-10 PROCEDURE — A9552 F18 FDG: HCPCS | Performed by: OTOLARYNGOLOGY

## 2019-10-10 PROCEDURE — 82962 GLUCOSE BLOOD TEST: CPT

## 2019-10-10 PROCEDURE — 0 FLUDEOXYGLUCOSE F18 SOLUTION: Performed by: OTOLARYNGOLOGY

## 2019-10-10 PROCEDURE — 78815 PET IMAGE W/CT SKULL-THIGH: CPT

## 2019-10-10 RX ADMIN — FLUDEOXYGLUCOSE F18 1 DOSE: 300 INJECTION INTRAVENOUS at 14:15

## 2019-10-15 ENCOUNTER — TRANSCRIBE ORDERS (OUTPATIENT)
Dept: ADMINISTRATIVE | Facility: HOSPITAL | Age: 54
End: 2019-10-15

## 2019-10-15 DIAGNOSIS — R22.1 NECK MASS: Primary | ICD-10-CM

## 2019-10-23 ENCOUNTER — HOSPITAL ENCOUNTER (OUTPATIENT)
Dept: MRI IMAGING | Facility: HOSPITAL | Age: 54
Discharge: HOME | End: 2019-10-23
Admitting: OTOLARYNGOLOGY

## 2019-10-23 DIAGNOSIS — R22.1 NECK MASS: ICD-10-CM

## 2019-10-23 PROCEDURE — 82565 ASSAY OF CREATININE: CPT

## 2019-10-23 PROCEDURE — A9577 INJ MULTIHANCE: HCPCS | Performed by: OTOLARYNGOLOGY

## 2019-10-23 PROCEDURE — 0 GADOBENATE DIMEGLUMINE 529 MG/ML SOLUTION: Performed by: OTOLARYNGOLOGY

## 2019-10-23 PROCEDURE — 70543 MRI ORBT/FAC/NCK W/O &W/DYE: CPT

## 2019-10-23 RX ADMIN — GADOBENATE DIMEGLUMINE 20 ML: 529 INJECTION, SOLUTION INTRAVENOUS at 15:07

## 2019-10-24 ENCOUNTER — OFFICE VISIT (OUTPATIENT)
Dept: ONCOLOGY | Facility: CLINIC | Age: 54
End: 2019-10-24

## 2019-10-24 VITALS
HEART RATE: 82 BPM | HEIGHT: 74 IN | BODY MASS INDEX: 39.14 KG/M2 | DIASTOLIC BLOOD PRESSURE: 93 MMHG | TEMPERATURE: 97.5 F | RESPIRATION RATE: 20 BRPM | SYSTOLIC BLOOD PRESSURE: 138 MMHG | OXYGEN SATURATION: 96 % | WEIGHT: 305 LBS

## 2019-10-24 DIAGNOSIS — C09.9 SQUAMOUS CELL CARCINOMA OF LEFT TONSIL (HCC): ICD-10-CM

## 2019-10-24 DIAGNOSIS — C77.0 SECONDARY MALIGNANCY OF LYMPH NODES OF HEAD, FACE AND NECK (HCC): Primary | ICD-10-CM

## 2019-10-24 PROCEDURE — 99214 OFFICE O/P EST MOD 30 MIN: CPT | Performed by: INTERNAL MEDICINE

## 2019-10-24 NOTE — PROGRESS NOTES
PROBLEM LIST:  Oncology/Hematology History     . 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   2. Severe allergic reaction to cetuximab. Pt did not tolerate cisplatin due to Severe Nausea  3. Completed IFRT on 5/25/16  4. Pet Scan 4 months post treatment 10/2016 - Negative       Squamous cell carcinoma of left tonsil     2/17/2016 Initial Diagnosis     Squamous cell carcinoma of left tonsil     4/4/2016 - 5/25/2016 Radiation     Radiation OncologyTreatment Course: Dr. Emery          REASON FOR VISIT: Follow-up of my head and neck cancer      Subjective     HISTORY OF PRESENT ILLNESS:   Mr. Richard is here for follow up evaluation of stage II squamous cell carcinoma of the left tonsil. He is 3.5  years from his diagnosis.  Doing clinically well.  He's gained all his weight back.  He presents after having a PET scan done.  He reports that he periodically has neck spasms.  And he was having one prior to his PET scan.  Subsequently his PET scan revealed hypermetabolism in the left parapharyngeal space.  So he underwent MRI.  Clinically otherwise he is doing reasonably well.    past Medical History, Past Surgical History, Social History, Family History have been reviewed and are without significant changes except as mentioned.    Review of Systems   Constitutional: Positive for fatigue. Negative for unexpected weight change.   HENT: Positive for hearing loss.    Eyes: Negative.    Respiratory: Negative.    Cardiovascular: Negative.    Gastrointestinal: Negative.    Endocrine: Negative.    Genitourinary: Negative.    Musculoskeletal: Negative.    Skin: Negative.    Allergic/Immunologic: Negative.    Neurological: Negative.    Hematological: Negative.    Psychiatric/Behavioral: Negative.       A comprehensive 14 point review of systems was performed and was negative except as mentioned.    Medications:  The current medication  "list was reviewed in the EMR    ALLERGIES:    Allergies   Allergen Reactions   • Adhesive Tape Other (See Comments)     Paper tape is good but other tape peels skin off   • Banana GI Intolerance   • Eggs Or Egg-Derived Products GI Intolerance   • Peanut-Containing Drug Products GI Intolerance   • Sulfa Antibiotics Other (See Comments)     Unsure reaction- not sure if actually allergic    • Wheat Extract GI Intolerance   • Bactrim [Sulfamethoxazole-Trimethoprim] Swelling and Rash   • Levofloxacin Swelling and Rash       Objective      /93 Comment: LUE  Pulse 82   Temp 97.5 °F (36.4 °C) (Temporal)   Resp 20   Ht 188 cm (74\")   Wt (!) 138 kg (305 lb)   SpO2 96% Comment: RA  BMI 39.16 kg/m²          General: well appearing, in no acute distress   HEENT: sclera anicteric, oropharynx clear, neck is supple  Lymphatics: no cervical, supraclavicular, or axillary adenopathy  Cardiovascular: regular rate and rhythm, no murmurs  Lungs: clear to auscultation bilaterally  Abdomen: soft, nontender, nondistended.  No palpable masses or organomegaly  Extremeties: no lower extremity edema, cords or calf tenderness  Skin: no rashes, lesions, bruising, or petechiae    Mri Neck Soft Tissue Only With & Without Contrast    Result Date: 10/24/2019  1.  Shotty left submandibular nodes, one of which appears borderline enlarged. 2.  Focus of activity noted in the left neck on previous PET scan appears to correspond to the normal appearing left longus capitis muscle. No visible underlying mass, node, or enhancement. 3.  Slight asymmetry in appearance of the lingual tonsils of doubtful significance. Neither this area nor abnormal activity were seen on recent PET scan.  D:  10/23/2019 E:  10/24/2019         Nm Pet Skull Base To Mid Thigh    Addendum Date: 10/11/2019    Addendum: Having reviewed this examination with 2 of my partners, it is now my opinion that the abnormality likely represents activity in the strap musculature which " could be physiological. Therefore the scan is not thought to be abnormal.  I attempted to reach Dr. Carrera by phone but unable to do so. I left a message regarding this revision with a nurse in his office.  This report was finalized on 10/11/2019 9:25 AM by Dr. Didier John MD.      Result Date: 10/11/2019  There is a small focal area of increased metabolic activity deep in the left parapharyngeal space with a max SUV of 4.33. There has otherwise been no change since the previous examination.  DICTATED:   10/10/2019 EDITED/ls :   10/10/2019    This report was finalized on 10/10/2019 7:02 PM by Dr. Didier John MD.          Assessment/Plan     1. stage II head and neck cancer.  I reviewed both his PET scan and MRI with him today.  Appears that he had likely a spasm which resulted in a positive PET scan at the site of the left longus capitis muscle.  It does not appear to be recurrence.  The shotty nodes all appeared normal and are hypometabolic on PET scan.  I will see him back in my clinic in 1 year.      2. Radiation related Hypothyroidism: on replacement synthroid     I will see him back in 1 year        I spent a total of 25 minutes in direct patient care, greater than 20 minutes (greater than 50%) were spent in coordination of care, and counseling the patient regarding squamous cell carcinoma of the tonsil. Answered any questions patient had with the imaging and  plan.      Kathleen Cervantes MD  [facility]    10/24/2019

## 2019-11-02 LAB — CREAT BLDA-MCNC: 0.8 MG/DL (ref 0.6–1.3)

## 2020-09-16 ENCOUNTER — TELEPHONE (OUTPATIENT)
Dept: ONCOLOGY | Facility: CLINIC | Age: 55
End: 2020-09-16

## 2020-09-16 DIAGNOSIS — C09.9 SQUAMOUS CELL CARCINOMA OF LEFT TONSIL (HCC): Primary | ICD-10-CM

## 2020-09-16 NOTE — TELEPHONE ENCOUNTER
Caller: Ken    Relationship to patient:     Best call back number: [phone]    Type of visit: yearly PET scan    Requested date: Anyday but 10/16, anytime.

## 2020-10-20 ENCOUNTER — TELEPHONE (OUTPATIENT)
Dept: ONCOLOGY | Facility: CLINIC | Age: 55
End: 2020-10-20

## 2020-10-20 NOTE — TELEPHONE ENCOUNTER
Returned call to patient. Left vm that Chandra is working on getting his scan added back on. Informing him that Chandra will be making contact with him soon.

## 2020-10-20 NOTE — TELEPHONE ENCOUNTER
CT for 10/21 was canceled because insurance denied it.  He spoke to his insurance who said that a peer to peer could be done to possibly get it approved.    Call them at  [phone]  [place]    Patient call back-  [phone]

## 2020-10-21 ENCOUNTER — HOSPITAL ENCOUNTER (OUTPATIENT)
Dept: CT IMAGING | Facility: HOSPITAL | Age: 55
End: 2020-10-21

## 2020-10-21 ENCOUNTER — TELEPHONE (OUTPATIENT)
Dept: ONCOLOGY | Facility: CLINIC | Age: 55
End: 2020-10-21

## 2020-10-21 ENCOUNTER — HOSPITAL ENCOUNTER (OUTPATIENT)
Dept: CT IMAGING | Facility: HOSPITAL | Age: 55
Discharge: HOME | End: 2020-10-21
Admitting: INTERNAL MEDICINE

## 2020-10-21 DIAGNOSIS — C09.9 SQUAMOUS CELL CARCINOMA OF LEFT TONSIL (HCC): ICD-10-CM

## 2020-10-21 LAB — CREAT BLDA-MCNC: 1 MG/DL (ref 0.6–1.3)

## 2020-10-21 PROCEDURE — 25010000002 IOPAMIDOL 61 % SOLUTION: Performed by: INTERNAL MEDICINE

## 2020-10-21 PROCEDURE — 82565 ASSAY OF CREATININE: CPT

## 2020-10-21 PROCEDURE — 70491 CT SOFT TISSUE NECK W/DYE: CPT

## 2020-10-21 RX ADMIN — IOPAMIDOL 85 ML: 612 INJECTION, SOLUTION INTRAVENOUS at 10:50

## 2020-10-21 NOTE — TELEPHONE ENCOUNTER
Returned call to patient after discussing with Dr. Cordero. Informing patient that his scans are negative showing no new disease.

## 2020-10-21 NOTE — TELEPHONE ENCOUNTER
Patient would like to speak to Chandra regarding CT that he had done this morning.    Please call him asap- [phone]

## 2020-10-23 ENCOUNTER — TELEPHONE (OUTPATIENT)
Dept: ONCOLOGY | Facility: CLINIC | Age: 55
End: 2020-10-23

## 2020-10-23 ENCOUNTER — OFFICE VISIT (OUTPATIENT)
Dept: ONCOLOGY | Facility: CLINIC | Age: 55
End: 2020-10-23

## 2020-10-23 VITALS
TEMPERATURE: 97.1 F | WEIGHT: 295 LBS | DIASTOLIC BLOOD PRESSURE: 87 MMHG | HEIGHT: 74 IN | OXYGEN SATURATION: 97 % | BODY MASS INDEX: 37.86 KG/M2 | RESPIRATION RATE: 20 BRPM | SYSTOLIC BLOOD PRESSURE: 136 MMHG | HEART RATE: 82 BPM

## 2020-10-23 DIAGNOSIS — C09.9 SQUAMOUS CELL CARCINOMA OF LEFT TONSIL (HCC): Primary | ICD-10-CM

## 2020-10-23 PROCEDURE — 99214 OFFICE O/P EST MOD 30 MIN: CPT | Performed by: INTERNAL MEDICINE

## 2020-10-23 RX ORDER — INSULIN DEGLUDEC INJECTION 100 U/ML
INJECTION, SOLUTION SUBCUTANEOUS
COMMUNITY
Start: 2020-10-06 | End: 2020-11-23

## 2020-10-23 RX ORDER — PEN NEEDLE, DIABETIC 32GX 5/32"
NEEDLE, DISPOSABLE MISCELLANEOUS DAILY
COMMUNITY
Start: 2020-08-17 | End: 2021-08-26

## 2020-10-23 RX ORDER — LISINOPRIL 40 MG/1
40 TABLET ORAL DAILY
COMMUNITY
Start: 2020-10-06 | End: 2021-04-28 | Stop reason: SDUPTHER

## 2020-10-23 RX ORDER — BLOOD SUGAR DIAGNOSTIC
STRIP MISCELLANEOUS
COMMUNITY
Start: 2020-08-17 | End: 2021-11-08 | Stop reason: SDUPTHER

## 2020-10-23 NOTE — TELEPHONE ENCOUNTER
Returned call to patient at this time informing patient that since he has no issues that wife will not be able to come back with patient.

## 2020-10-23 NOTE — PROGRESS NOTES
PROBLEM LIST:  Oncology/Hematology History     . 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   2. Severe allergic reaction to cetuximab. Pt did not tolerate cisplatin due to Severe Nausea  3. Completed IFRT on 5/25/16  4. Pet Scan 4 months post treatment 10/2016 - Negative       Squamous cell carcinoma of left tonsil     2/17/2016 Initial Diagnosis     Squamous cell carcinoma of left tonsil     4/4/2016 - 5/25/2016 Radiation     Radiation OncologyTreatment Course: Dr. Emery          REASON FOR VISIT: Follow-up of my head and neck cancer      Subjective     HISTORY OF PRESENT ILLNESS:   Mr. Richard is here for follow up evaluation of stage II squamous cell carcinoma of the left tonsil. He is 4 years from his diagnosis.  He continues to have hearing loss.  He has some fatigue.  He was diagnosed with diabetes.  Subsequently he has done reasonably well.  Denies any issues with headaches.  No trouble swallowing.      past Medical History, Past Surgical History, Social History, Family History have been reviewed and are without significant changes except as mentioned.    Review of Systems   Constitutional: Positive for fatigue. Negative for unexpected weight change.   HENT: Positive for hearing loss.    Eyes: Negative.    Respiratory: Negative.    Cardiovascular: Negative.    Gastrointestinal: Negative.    Endocrine: Negative.    Genitourinary: Negative.    Musculoskeletal: Negative.    Skin: Negative.    Allergic/Immunologic: Negative.    Neurological: Negative.    Hematological: Negative.    Psychiatric/Behavioral: Negative.       A comprehensive 14 point review of systems was performed and was negative except as mentioned.    Medications:  The current medication list was reviewed in the EMR    ALLERGIES:    Allergies   Allergen Reactions   • Adhesive Tape Other (See Comments)     Paper tape is good but other tape peels skin  "off   • Banana GI Intolerance   • Eggs Or Egg-Derived Products GI Intolerance   • Peanut-Containing Drug Products GI Intolerance   • Sulfa Antibiotics Other (See Comments)     Unsure reaction- not sure if actually allergic    • Wheat Extract GI Intolerance   • Bactrim [Sulfamethoxazole-Trimethoprim] Swelling and Rash   • Levofloxacin Swelling and Rash       Objective      /87 Comment: LUE  Pulse 82   Temp 97.1 °F (36.2 °C) (Temporal)   Resp 20   Ht 188 cm (74\")   Wt 134 kg (295 lb)   SpO2 97% Comment: RA  BMI 37.88 kg/m²          General: well appearing, in no acute distress   HEENT: sclera anicteric, oropharynx clear, neck is supple  Lymphatics: no cervical, supraclavicular, or axillary adenopathy  Cardiovascular: regular rate and rhythm, no murmurs  Lungs: clear to auscultation bilaterally  Abdomen: soft, nontender, nondistended.  No palpable masses or organomegaly  Extremeties: no lower extremity edema, cords or calf tenderness  Skin: no rashes, lesions, bruising, or petechiae    Ct Soft Tissue Neck With Contrast    Result Date: 10/21/2020  No evidence of recurrent lesion or new suspicious pathologic cervical adenopathy.   This report was finalized on 10/21/2020 1:19 PM by Darrell Feng.            Assessment/Plan     1. stage II head and neck cancer.  I reviewed his CAT scan.  He still has some shotty nodes in the cervical chain.  Does not appear to have changed much to over the last year.  He is over 4 years out from his diagnosis.  I think it is reasonable to consider 1 last CAT scan in a year.  If that looks negative then he does not need any further follow-up.  His risk at that point would be fairly small.    2. Radiation related Hypothyroidism: on replacement synthroid    3.  New onset diabetes.  Patient on diabetic medicines for control.     I will see him back in 1 year            Kathleen Cervantes MD  [facility] Hematology and On[facility]    10/23/2020    "

## 2020-10-23 NOTE — TELEPHONE ENCOUNTER
Ken is asking if his wife could come to his appt w/ him today for his test results     Call back # [phone]

## 2020-11-23 ENCOUNTER — OFFICE VISIT (OUTPATIENT)
Dept: ENDOCRINOLOGY | Facility: CLINIC | Age: 55
End: 2020-11-23

## 2020-11-23 VITALS
HEART RATE: 90 BPM | SYSTOLIC BLOOD PRESSURE: 126 MMHG | DIASTOLIC BLOOD PRESSURE: 80 MMHG | WEIGHT: 299.8 LBS | BODY MASS INDEX: 38.48 KG/M2 | OXYGEN SATURATION: 97 % | TEMPERATURE: 97.1 F | HEIGHT: 74 IN

## 2020-11-23 DIAGNOSIS — IMO0002 DIABETES MELLITUS TYPE 2, UNCONTROLLED, WITH COMPLICATIONS: Primary | ICD-10-CM

## 2020-11-23 DIAGNOSIS — E89.0 POSTABLATIVE HYPOTHYROIDISM: ICD-10-CM

## 2020-11-23 LAB
EXPIRATION DATE: NORMAL
HBA1C MFR BLD: 6.6 %
Lab: NORMAL

## 2020-11-23 PROCEDURE — 83036 HEMOGLOBIN GLYCOSYLATED A1C: CPT | Performed by: INTERNAL MEDICINE

## 2020-11-23 PROCEDURE — 99213 OFFICE O/P EST LOW 20 MIN: CPT | Performed by: INTERNAL MEDICINE

## 2020-11-23 NOTE — PROGRESS NOTES
"     Office Note      Date: 2020  Patient Name: Ken Richard  MRN: [id number]  : 1965    Chief Complaint   Patient presents with   • Diabetes       History of Present Illness:   Ken Richard is a 55 y.o. male who presents for Diabetes- diagnosed summer 2020  Started metformin. And insulin. Unable to tolerate full dose metformin, he is taking just 1 tablet per day.  He is on 27 units of insulin. Fasting bg had been 110-130 but now 150-60 the last little bit.  He has been forgetting to check sometimes.  He has been out hunting and meals have been less structured.  Lowest was 100.    Changes in health since last visit: none . Last eye exam overdue.    Subjective            Review of Systems:   Review of Systems   Constitutional: Negative.    HENT: Negative.    Eyes: Negative.    Respiratory: Negative.    Cardiovascular: Negative.    Gastrointestinal: Negative.    Endocrine: Negative.    Genitourinary: Negative.    Musculoskeletal: Negative.    Skin: Negative.    Allergic/Immunologic: Negative.    Neurological: Negative.    Hematological: Negative.    Psychiatric/Behavioral: Negative.        The following portions of the patient's history were reviewed and updated as appropriate: allergies, current medications, past family history, past medical history, past social history, past surgical history and problem list.    Objective     Visit Vitals  /80 (BP Location: Left arm, Patient Position: Sitting, Cuff Size: Adult)   Pulse 90   Temp 97.1 °F (36.2 °C) (Infrared)   Ht 188 cm (74\")   Wt 136 kg (299 lb 12.8 oz)   SpO2 97%   BMI 38.49 kg/m²       Labs:    CMP  Lab Results   Component Value Date    GLUCOSE 127 (H) 03/15/2019    BUN 23 03/15/2019    CREATININE 1.00 10/21/2020    EGFRIFNONA 89 03/15/2019    BCR 25.6 (H) 03/15/2019    K 4.1 03/15/2019    CO2 25.0 03/15/2019    CALCIUM 8.8 03/15/2019    AST 24 03/15/2019    ALT 50 (H) 03/15/2019        CBC w/DIFF  Lab Results   Component Value Date "    WBC 6.55 03/15/2019    RBC 4.38 03/15/2019    HGB 14.3 03/15/2019    HCT 43.3 03/15/2019    MCV 98.9 03/15/2019    MCH 32.6 (H) 03/15/2019    MCHC 33.0 03/15/2019    RDW 12.6 03/15/2019    RDWSD 45.5 03/15/2019    MPV 10.7 03/15/2019     03/15/2019    NEUTRORELPCT 84.7 (H) 06/20/2016    LYMPHORELPCT 8.8 (L) 06/20/2016    MONORELPCT 4.6 06/20/2016    EOSRELPCT 1.3 06/20/2016    BASORELPCT 0.4 06/20/2016    AUTOIGPER 0.2 06/20/2016    NEUTROABS 3.86 06/20/2016    LYMPHSABS 0.40 (L) 06/20/2016    MONOSABS 0.21 06/20/2016    EOSABS 0.06 (L) 06/20/2016    BASOSABS 0.02 06/20/2016    AUTOIGNUM 0.01 06/20/2016       Physical Exam:  Physical Exam     Assessment / Plan      Assessment & Plan:  Problem List Items Addressed This Visit        Endocrine    Postablative hypothyroidism    Current Assessment & Plan     Euthyroid based upon labs last time          Relevant Medications    levothyroxine (SYNTHROID, LEVOTHROID) 100 MCG tablet    Diabetes mellitus type 2, uncontrolled, with complications (CMS/HCC) - Primary    Current Assessment & Plan     a1c dramatically improved from over 11 to 6.6      - stop metformin and insulin and use januvia          Relevant Medications    SITagliptin (Januvia) 100 MG tablet    Other Relevant Orders    POC Glycosylated Hemoglobin (Hb A1C) (Completed)           Sebastian Lugo MD   11/23/2020

## 2021-04-28 RX ORDER — LISINOPRIL 40 MG/1
40 TABLET ORAL DAILY
Qty: 30 TABLET | Refills: 2 | Status: SHIPPED | OUTPATIENT
Start: 2021-04-28 | End: 2021-08-09 | Stop reason: SDUPTHER

## 2021-05-17 ENCOUNTER — PRIOR AUTHORIZATION (OUTPATIENT)
Dept: ENDOCRINOLOGY | Facility: CLINIC | Age: 56
End: 2021-05-17

## 2021-05-17 NOTE — TELEPHONE ENCOUNTER
VIANEY MCKENZIE Key: [id number] - PA Case ID: [id number] - Rx #: [id number] help? Call us at [phone]  Outcome  Approvedtoday  Your PA request has been approved. Additional information will be provided in the approval communication. (Message [phone])  Drug  Januvia 100MG tablets  Form  [employer] Electronic PA Form (2017 NCPDP)  Original Claim Info  76,75 MUST MEET STEP OR MD CALL [phone] THERAPY IS REQUIREDDRUG REQUIRES PRIOR AUTHORIZATION(PHARMACY HELP DESK [phone])    PHAR AND PATIENT NOTIFIED. CHIDI

## 2021-08-09 RX ORDER — LISINOPRIL 40 MG/1
40 TABLET ORAL DAILY
Qty: 30 TABLET | Refills: 2 | Status: SHIPPED | OUTPATIENT
Start: 2021-08-09 | End: 2021-08-26 | Stop reason: SDUPTHER

## 2021-08-09 NOTE — TELEPHONE ENCOUNTER
Patient called and stated he is out of his lisinopril (PRINIVIL,ZESTRIL) 40 MG tablet and is needing a refill before his appointment on 8/26/21.

## 2021-08-26 ENCOUNTER — LAB (OUTPATIENT)
Dept: LAB | Facility: HOSPITAL | Age: 56
End: 2021-08-26

## 2021-08-26 ENCOUNTER — OFFICE VISIT (OUTPATIENT)
Dept: ENDOCRINOLOGY | Facility: CLINIC | Age: 56
End: 2021-08-26

## 2021-08-26 VITALS
HEIGHT: 74 IN | WEIGHT: 308 LBS | DIASTOLIC BLOOD PRESSURE: 82 MMHG | BODY MASS INDEX: 39.53 KG/M2 | HEART RATE: 84 BPM | SYSTOLIC BLOOD PRESSURE: 148 MMHG

## 2021-08-26 DIAGNOSIS — E11.65 UNCONTROLLED TYPE 2 DIABETES MELLITUS WITH HYPERGLYCEMIA (HCC): Primary | ICD-10-CM

## 2021-08-26 DIAGNOSIS — E89.0 POSTABLATIVE HYPOTHYROIDISM: ICD-10-CM

## 2021-08-26 PROBLEM — IMO0002 DIABETES MELLITUS TYPE 2, UNCONTROLLED, WITH COMPLICATIONS: Status: RESOLVED | Noted: 2020-11-23 | Resolved: 2021-08-26

## 2021-08-26 LAB
EXPIRATION DATE: ABNORMAL
EXPIRATION DATE: NORMAL
GLUCOSE BLDC GLUCOMTR-MCNC: 165 MG/DL (ref 70–130)
HBA1C MFR BLD: 7.1 %
Lab: ABNORMAL
Lab: NORMAL

## 2021-08-26 PROCEDURE — 82947 ASSAY GLUCOSE BLOOD QUANT: CPT | Performed by: INTERNAL MEDICINE

## 2021-08-26 PROCEDURE — 83036 HEMOGLOBIN GLYCOSYLATED A1C: CPT | Performed by: INTERNAL MEDICINE

## 2021-08-26 PROCEDURE — 84443 ASSAY THYROID STIM HORMONE: CPT

## 2021-08-26 PROCEDURE — 99214 OFFICE O/P EST MOD 30 MIN: CPT | Performed by: INTERNAL MEDICINE

## 2021-08-26 RX ORDER — LISINOPRIL 40 MG/1
40 TABLET ORAL DAILY
Qty: 30 TABLET | Refills: 2 | Status: SHIPPED | OUTPATIENT
Start: 2021-08-26 | End: 2021-11-29

## 2021-08-26 RX ORDER — MELOXICAM 15 MG/1
TABLET ORAL
COMMUNITY
Start: 2021-06-28 | End: 2021-08-26 | Stop reason: SDUPTHER

## 2021-08-26 RX ORDER — MELOXICAM 15 MG/1
TABLET ORAL
Qty: 30 TABLET | Refills: 5 | Status: SHIPPED | OUTPATIENT
Start: 2021-08-26 | End: 2022-03-08

## 2021-08-26 NOTE — PROGRESS NOTES
"     Office Note      Date: 2021  Patient Name: Ken Richard  MRN: [id number]  : 1965    Chief Complaint   Patient presents with   • Diabetes       History of Present Illness: -   Ken Richard is a 55 y.o. male who presents for Diabetes- type 2  On januvia  bg checks are done daily- usually 150-200 lately    No hypos   Last A1c:  Hemoglobin A1C   Date Value Ref Range Status   2020 6.6 % Final       Changes in health since last visit: wt gain. None . Last eye exam due and advised .    Subjective              Review of Systems:   Review of Systems   Constitutional: Positive for fatigue.   Endocrine: Positive for polydipsia and polyuria.       The following portions of the patient's history were reviewed and updated as appropriate: allergies, current medications, past family history, past medical history, past social history, past surgical history and problem list.    Objective     Visit Vitals  /82   Pulse 84   Ht 188 cm (74\")   Wt (!) 140 kg (308 lb)   BMI 39.54 kg/m²       Labs:    CMP  Lab Results   Component Value Date    GLUCOSE 127 (H) 03/15/2019    BUN 23 03/15/2019    CREATININE 1.00 10/21/2020    EGFRIFNONA 89 03/15/2019    BCR 25.6 (H) 03/15/2019    K 4.1 03/15/2019    CO2 25.0 03/15/2019    CALCIUM 8.8 03/15/2019    AST 24 03/15/2019    ALT 50 (H) 03/15/2019        CBC w/DIFF  Lab Results   Component Value Date    WBC 6.55 03/15/2019    RBC 4.38 03/15/2019    HGB 14.3 03/15/2019    HCT 43.3 03/15/2019    MCV 98.9 03/15/2019    MCH 32.6 (H) 03/15/2019    MCHC 33.0 03/15/2019    RDW 12.6 03/15/2019    RDWSD 45.5 03/15/2019    MPV 10.7 03/15/2019     03/15/2019    NEUTRORELPCT 84.7 (H) 2016    LYMPHORELPCT 8.8 (L) 2016    MONORELPCT 4.6 2016    EOSRELPCT 1.3 2016    BASORELPCT 0.4 2016    AUTOIGPER 0.2 2016    NEUTROABS 3.86 2016    LYMPHSABS 0.40 (L) 2016    MONOSABS 0.21 2016    EOSABS 0.06 (L) 2016    " MAICOLOSABS 0.02 06/20/2016    AUTOIGNUM 0.01 06/20/2016       Physical Exam:  Physical Exam  Vitals reviewed.   Constitutional:       Appearance: Normal appearance. He is normal weight.   Cardiovascular:      Pulses:           Dorsalis pedis pulses are 2+ on the right side and 2+ on the left side.        Posterior tibial pulses are 2+ on the right side and 2+ on the left side.   Musculoskeletal:      Right foot: Normal range of motion. No deformity, bunion, Charcot foot, foot drop or prominent metatarsal heads.      Left foot: Normal range of motion. No deformity, bunion, Charcot foot, foot drop or prominent metatarsal heads.   Feet:      Right foot:      Protective Sensation: 10 sites tested. 6 sites sensed.      Skin integrity: Skin integrity normal.      Toenail Condition: Right toenails are normal.      Left foot:      Protective Sensation: 10 sites tested. 6 sites sensed.      Skin integrity: Skin integrity normal.      Toenail Condition: Left toenails are normal.      Comments: Diabetic Foot Exam Performed and Monofilament Test Performed    Neurological:      Mental Status: He is alert.   Psychiatric:         Mood and Affect: Mood normal.         Thought Content: Thought content normal.         Judgment: Judgment normal.          Assessment / Plan      Assessment & Plan:  Problem List Items Addressed This Visit        Other    Postablative hypothyroidism    Current Assessment & Plan     Clinically euthyroid. Thyroid levels ordered. Medication to be adjusted accordingly.         Relevant Medications    levothyroxine (SYNTHROID, LEVOTHROID) 100 MCG tablet    Uncontrolled type 2 diabetes mellitus with hyperglycemia (CMS/[facility]) - Primary    Current Assessment & Plan     a1c up due to stress but not high enough to warrant a change in meds          Relevant Medications    SITagliptin (Januvia) 100 MG tablet    Other Relevant Orders    POC Glycosylated Hemoglobin (Hb A1C)    POC Glucose, Blood (Completed)           Sebastian  Juan Manuel Lugo MD   08/26/2021

## 2021-08-27 LAB — TSH SERPL DL<=0.05 MIU/L-ACNC: 3.46 UIU/ML (ref 0.27–4.2)

## 2021-10-20 ENCOUNTER — HOSPITAL ENCOUNTER (OUTPATIENT)
Dept: CT IMAGING | Facility: HOSPITAL | Age: 56
Discharge: HOME | End: 2021-10-20
Admitting: INTERNAL MEDICINE

## 2021-10-20 ENCOUNTER — OFFICE VISIT (OUTPATIENT)
Dept: ONCOLOGY | Facility: CLINIC | Age: 56
End: 2021-10-20

## 2021-10-20 VITALS
HEIGHT: 74 IN | RESPIRATION RATE: 20 BRPM | WEIGHT: 309 LBS | OXYGEN SATURATION: 94 % | TEMPERATURE: 97.8 F | BODY MASS INDEX: 39.66 KG/M2 | DIASTOLIC BLOOD PRESSURE: 79 MMHG | SYSTOLIC BLOOD PRESSURE: 140 MMHG | HEART RATE: 97 BPM

## 2021-10-20 DIAGNOSIS — C09.9 SQUAMOUS CELL CARCINOMA OF LEFT TONSIL (HCC): ICD-10-CM

## 2021-10-20 DIAGNOSIS — C09.9 SQUAMOUS CELL CARCINOMA OF LEFT TONSIL (HCC): Primary | ICD-10-CM

## 2021-10-20 LAB — CREAT BLDA-MCNC: 1 MG/DL (ref 0.6–1.3)

## 2021-10-20 PROCEDURE — 70491 CT SOFT TISSUE NECK W/DYE: CPT

## 2021-10-20 PROCEDURE — 25010000002 IOPAMIDOL 61 % SOLUTION: Performed by: INTERNAL MEDICINE

## 2021-10-20 PROCEDURE — 82565 ASSAY OF CREATININE: CPT

## 2021-10-20 PROCEDURE — 99214 OFFICE O/P EST MOD 30 MIN: CPT | Performed by: INTERNAL MEDICINE

## 2021-10-20 RX ADMIN — IOPAMIDOL 75 ML: 612 INJECTION, SOLUTION INTRAVENOUS at 09:50

## 2021-10-20 NOTE — PROGRESS NOTES
PROBLEM LIST:  Oncology/Hematology History     . 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   2. Severe allergic reaction to cetuximab. Pt did not tolerate cisplatin due to Severe Nausea  3. Completed IFRT on 5/25/16  4. Pet Scan 4 months post treatment 10/2016 - Negative       Squamous cell carcinoma of left tonsil     2/17/2016 Initial Diagnosis     Squamous cell carcinoma of left tonsil     4/4/2016 - 5/25/2016 Radiation     Radiation OncologyTreatment Course: Dr. Emery          REASON FOR VISIT: Follow-up of my head and neck cancer      Subjective     HISTORY OF PRESENT ILLNESS:   Mr. Richard is here for follow up evaluation of stage II squamous cell carcinoma of the left tonsil. He is 5 years from his diagnosis.  He continues to have hearing loss.  He has some fatigue.  He was diagnosed with diabetes.  Subsequently he has done reasonably well.  Denies any issues with headaches.  No trouble swallowing. Follows with Dr. Wood for periodic exams.      past Medical History, Past Surgical History, Social History, Family History have been reviewed and are without significant changes except as mentioned.    Review of Systems   Constitutional: Positive for fatigue. Negative for unexpected weight change.   HENT: Positive for hearing loss.    Eyes: Negative.    Respiratory: Negative.    Cardiovascular: Negative.    Gastrointestinal: Negative.    Endocrine: Negative.    Genitourinary: Negative.    Musculoskeletal: Negative.    Skin: Negative.    Allergic/Immunologic: Negative.    Neurological: Negative.    Hematological: Negative.    Psychiatric/Behavioral: Negative.       A comprehensive 14 point review of systems was performed and was negative except as mentioned.    Medications:  The current medication list was reviewed in the EMR    ALLERGIES:    Allergies   Allergen Reactions   • Adhesive Tape Other (See Comments)  "    Paper tape is good but other tape peels skin off   • Banana GI Intolerance   • Eggs Or Egg-Derived Products GI Intolerance   • Peanut-Containing Drug Products GI Intolerance   • Sulfa Antibiotics Other (See Comments)     Unsure reaction- not sure if actually allergic    • Wheat Extract GI Intolerance   • Bactrim [Sulfamethoxazole-Trimethoprim] Swelling and Rash   • Levofloxacin Swelling and Rash       Objective      /79   Pulse 97   Temp 97.8 °F (36.6 °C) (Temporal)   Resp 20   Ht 188 cm (74\")   Wt (!) 140 kg (309 lb)   SpO2 94%   BMI 39.67 kg/m²          General: well appearing, in no acute distress   HEENT: sclera anicteric, oropharynx clear, neck is supple  Lymphatics: no cervical, supraclavicular, or axillary adenopathy  Cardiovascular: regular rate and rhythm, no murmurs  Lungs: clear to auscultation bilaterally  Abdomen: soft, nontender, nondistended.  No palpable masses or organomegaly  Extremeties: no lower extremity edema, cords or calf tenderness  Skin: no rashes, lesions, bruising, or petechiae    CT Soft Tissue Neck With Contrast    Result Date: 10/20/2021  Stable CT scan of the neck including slightly thickened right palatine tonsil, and shotty submandibular lymph nodes. No new or progressive abnormality to suggest recurrence or other new disease.  D:  10/20/2021 E:  10/20/2021             Assessment/Plan     1. stage II head and neck cancer.  He is now 5 years out from his diagnosis.  He has no clinical or imaging to suggest recurrence.  At this point he does not need any further imaging from my standpoint.  He will follow up with Dr. Wood for periodic exams.    2. Radiation related Hypothyroidism: on replacement synthroid                  Kathleen Cervantes MD  [facility]    10/20/2021    "

## 2021-11-08 RX ORDER — BLOOD SUGAR DIAGNOSTIC
STRIP MISCELLANEOUS
Qty: 100 EACH | Refills: 1 | Status: SHIPPED | OUTPATIENT
Start: 2021-11-08 | End: 2022-06-29

## 2021-11-15 RX ORDER — LEVOTHYROXINE SODIUM 0.1 MG/1
100 TABLET ORAL DAILY
Qty: 90 TABLET | Refills: 1 | Status: SHIPPED | OUTPATIENT
Start: 2021-11-15 | End: 2022-06-06

## 2021-11-29 RX ORDER — LISINOPRIL 40 MG/1
TABLET ORAL
Qty: 30 TABLET | Refills: 5 | Status: SHIPPED | OUTPATIENT
Start: 2021-11-29 | End: 2022-04-07 | Stop reason: SDUPTHER

## 2021-12-27 DIAGNOSIS — IMO0002 DIABETES MELLITUS TYPE 2, UNCONTROLLED, WITH COMPLICATIONS: ICD-10-CM

## 2021-12-27 RX ORDER — SITAGLIPTIN 100 MG/1
TABLET, FILM COATED ORAL
Qty: 90 TABLET | Refills: 0 | Status: SHIPPED | OUTPATIENT
Start: 2021-12-27 | End: 2022-04-04

## 2022-03-08 RX ORDER — MELOXICAM 15 MG/1
TABLET ORAL
Qty: 30 TABLET | Refills: 1 | Status: SHIPPED | OUTPATIENT
Start: 2022-03-08 | End: 2022-05-18

## 2022-04-04 RX ORDER — SITAGLIPTIN 100 MG/1
TABLET, FILM COATED ORAL
Qty: 90 TABLET | Refills: 0 | Status: SHIPPED | OUTPATIENT
Start: 2022-04-04 | End: 2022-04-07 | Stop reason: SDUPTHER

## 2022-04-07 ENCOUNTER — OFFICE VISIT (OUTPATIENT)
Dept: ENDOCRINOLOGY | Facility: CLINIC | Age: 57
End: 2022-04-07

## 2022-04-07 ENCOUNTER — PRIOR AUTHORIZATION (OUTPATIENT)
Dept: ENDOCRINOLOGY | Facility: CLINIC | Age: 57
End: 2022-04-07

## 2022-04-07 ENCOUNTER — LAB (OUTPATIENT)
Dept: LAB | Facility: HOSPITAL | Age: 57
End: 2022-04-07

## 2022-04-07 VITALS
SYSTOLIC BLOOD PRESSURE: 140 MMHG | RESPIRATION RATE: 12 BRPM | HEIGHT: 74 IN | DIASTOLIC BLOOD PRESSURE: 90 MMHG | OXYGEN SATURATION: 99 % | BODY MASS INDEX: 39.01 KG/M2 | WEIGHT: 304 LBS | HEART RATE: 81 BPM

## 2022-04-07 DIAGNOSIS — E11.65 UNCONTROLLED TYPE 2 DIABETES MELLITUS WITH HYPERGLYCEMIA: ICD-10-CM

## 2022-04-07 DIAGNOSIS — E89.0 POSTABLATIVE HYPOTHYROIDISM: ICD-10-CM

## 2022-04-07 DIAGNOSIS — E11.65 UNCONTROLLED TYPE 2 DIABETES MELLITUS WITH HYPERGLYCEMIA: Primary | ICD-10-CM

## 2022-04-07 LAB
EXPIRATION DATE: ABNORMAL
EXPIRATION DATE: NORMAL
GLUCOSE BLDC GLUCOMTR-MCNC: 272 MG/DL (ref 70–130)
HBA1C MFR BLD: 9.4 %
Lab: ABNORMAL
Lab: NORMAL

## 2022-04-07 PROCEDURE — 80053 COMPREHEN METABOLIC PANEL: CPT

## 2022-04-07 PROCEDURE — 82043 UR ALBUMIN QUANTITATIVE: CPT

## 2022-04-07 PROCEDURE — 83036 HEMOGLOBIN GLYCOSYLATED A1C: CPT | Performed by: INTERNAL MEDICINE

## 2022-04-07 PROCEDURE — 82947 ASSAY GLUCOSE BLOOD QUANT: CPT | Performed by: INTERNAL MEDICINE

## 2022-04-07 PROCEDURE — 99214 OFFICE O/P EST MOD 30 MIN: CPT | Performed by: INTERNAL MEDICINE

## 2022-04-07 PROCEDURE — 80061 LIPID PANEL: CPT

## 2022-04-07 PROCEDURE — 82570 ASSAY OF URINE CREATININE: CPT

## 2022-04-07 PROCEDURE — 84443 ASSAY THYROID STIM HORMONE: CPT

## 2022-04-07 RX ORDER — DAPAGLIFLOZIN 5 MG/1
5 TABLET, FILM COATED ORAL DAILY
Qty: 90 TABLET | Refills: 3 | Status: SHIPPED | OUTPATIENT
Start: 2022-04-07 | End: 2023-04-03

## 2022-04-07 RX ORDER — SILDENAFIL CITRATE 20 MG/1
TABLET ORAL
Qty: 10 TABLET | Refills: 3 | Status: SHIPPED | OUTPATIENT
Start: 2022-04-07 | End: 2022-10-07 | Stop reason: SDUPTHER

## 2022-04-07 RX ORDER — LISINOPRIL 40 MG/1
1 TABLET ORAL DAILY
COMMUNITY
End: 2022-06-20

## 2022-04-07 NOTE — PROGRESS NOTES
"     Office Note      Date: 2022  Patient Name: Ken Richard  MRN: [id number]  : 1965    Chief Complaint   Patient presents with   • Diabetes       History of Present Illness:   Ken Richard is a 56 y.o. male who presents for Diabetes - type 2. On januvia.  He was sick with covid in February. It was complicated with pneumonia. He had to take abx and 2 rounds of steroids.  bg checks are done daily.   250-260 range.     Last A1c:  Hemoglobin A1C   Date Value Ref Range Status   2022 9.4 % Final       Changes in health since last visit: see above . Last eye exam up coming. .    Subjective          Review of Systems:   Review of Systems   Eyes: Negative for visual disturbance.   Skin:        Athlete's foot        The following portions of the patient's history were reviewed and updated as appropriate: allergies, current medications, past family history, past medical history, past social history, past surgical history and problem list.    Objective     Visit Vitals  /90   Pulse 81   Resp 12   Ht 188 cm (74\")   Wt (!) 138 kg (304 lb)   SpO2 99%   BMI 39.03 kg/m²       Labs:    CMP  Lab Results   Component Value Date    GLUCOSE 127 (H) 03/15/2019    BUN 23 03/15/2019    CREATININE 1.00 10/20/2021    EGFRIFNONA 89 03/15/2019    BCR 25.6 (H) 03/15/2019    K 4.1 03/15/2019    CO2 25.0 03/15/2019    CALCIUM 8.8 03/15/2019    AST 24 03/15/2019    ALT 50 (H) 03/15/2019        CBC w/DIFF  Lab Results   Component Value Date    WBC 6.55 03/15/2019    RBC 4.38 03/15/2019    HGB 14.3 03/15/2019    HCT 43.3 03/15/2019    MCV 98.9 03/15/2019    MCH 32.6 (H) 03/15/2019    MCHC 33.0 03/15/2019    RDW 12.6 03/15/2019    RDWSD 45.5 03/15/2019    MPV 10.7 03/15/2019     03/15/2019    NEUTRORELPCT 84.7 (H) 2016    LYMPHORELPCT 8.8 (L) 2016    MONORELPCT 4.6 2016    EOSRELPCT 1.3 2016    BASORELPCT 0.4 2016    AUTOIGPER 0.2 2016    NEUTROABS 3.86 2016    " LYMPHSABS 0.40 (L) 06/20/2016    MONOSABS 0.21 06/20/2016    EOSABS 0.06 (L) 06/20/2016    BASOSABS 0.02 06/20/2016    AUTOIGNUM 0.01 06/20/2016       Physical Exam:  Physical Exam  Vitals reviewed.   Constitutional:       Appearance: Normal appearance.   Neurological:      Mental Status: He is alert.          Assessment / Plan      Assessment & Plan:  Problem List Items Addressed This Visit        Other    Postablative hypothyroidism    Current Assessment & Plan     Clinically euthyroid. Thyroid levels ordered. Medication to be adjusted accordingly.           Relevant Medications    levothyroxine (SYNTHROID, LEVOTHROID) 100 MCG tablet    Other Relevant Orders    TSH    Uncontrolled type 2 diabetes mellitus with hyperglycemia (HCC) - Primary    Current Assessment & Plan      Much worse. We reviewed medication options and will use sglt2. Side effects were discussed   Will use viagra for e.d. side effexs were discussed           Relevant Medications    dapagliflozin (Farxiga) 5 MG tablet tablet    SITagliptin (Januvia) 100 MG tablet    Other Relevant Orders    POC Glucose, Blood (Completed)    POC Glycosylated Hemoglobin (Hb A1C) (Completed)    Comprehensive Metabolic Panel    Lipid Panel    Microalbumin / Creatinine Urine Ratio - Urine, Clean Catch           Homer Juan Manuel Lugo MD   04/07/2022

## 2022-04-07 NOTE — ASSESSMENT & PLAN NOTE
Much worse. We reviewed medication options and will use sglt2. Side effects were discussed   Will use viagra for e.d. side effexs were discussed

## 2022-04-07 NOTE — TELEPHONE ENCOUNTER
VIANEY MCKENZIE Key: [id number] - PA Case ID: [id number] - Rx #: [id number] help? Call us at [phone]  Outcome  Approvedtoday  Your PA request has been approved. Additional information will be provided in the approval communication. (Message 1145)  Drug  Farxiga 5MG tablets  Form  [employer] Electronic PA Form (2017 [employer])  Original Claim Info  76,18

## 2022-04-08 ENCOUNTER — PRIOR AUTHORIZATION (OUTPATIENT)
Dept: ENDOCRINOLOGY | Facility: CLINIC | Age: 57
End: 2022-04-08

## 2022-04-08 LAB
ALBUMIN SERPL-MCNC: 4.7 G/DL (ref 3.5–5.2)
ALBUMIN UR-MCNC: 2 MG/DL
ALBUMIN/GLOB SERPL: 1.8 G/DL
ALP SERPL-CCNC: 80 U/L (ref 39–117)
ALT SERPL W P-5'-P-CCNC: 60 U/L (ref 1–41)
ANION GAP SERPL CALCULATED.3IONS-SCNC: 12.1 MMOL/L (ref 5–15)
AST SERPL-CCNC: 23 U/L (ref 1–40)
BILIRUB SERPL-MCNC: 0.4 MG/DL (ref 0–1.2)
BUN SERPL-MCNC: 15 MG/DL (ref 6–20)
BUN/CREAT SERPL: 14.3 (ref 7–25)
CALCIUM SPEC-SCNC: 9.3 MG/DL (ref 8.6–10.5)
CHLORIDE SERPL-SCNC: 99 MMOL/L (ref 98–107)
CHOLEST SERPL-MCNC: 171 MG/DL (ref 0–200)
CO2 SERPL-SCNC: 23.9 MMOL/L (ref 22–29)
CREAT SERPL-MCNC: 1.05 MG/DL (ref 0.76–1.27)
CREAT UR-MCNC: 192.9 MG/DL
EGFRCR SERPLBLD CKD-EPI 2021: 84.4 ML/MIN/1.73
GLOBULIN UR ELPH-MCNC: 2.6 GM/DL
GLUCOSE SERPL-MCNC: 261 MG/DL (ref 65–99)
HDLC SERPL-MCNC: 32 MG/DL (ref 40–60)
LDLC SERPL CALC-MCNC: 96 MG/DL (ref 0–100)
LDLC/HDLC SERPL: 2.75 {RATIO}
MICROALBUMIN/CREAT UR: 10.4 MG/G
POTASSIUM SERPL-SCNC: 4.8 MMOL/L (ref 3.5–5.2)
PROT SERPL-MCNC: 7.3 G/DL (ref 6–8.5)
SODIUM SERPL-SCNC: 135 MMOL/L (ref 136–145)
TRIGL SERPL-MCNC: 255 MG/DL (ref 0–150)
TSH SERPL DL<=0.05 MIU/L-ACNC: 4.5 UIU/ML (ref 0.27–4.2)
VLDLC SERPL-MCNC: 43 MG/DL (ref 5–40)

## 2022-04-08 NOTE — TELEPHONE ENCOUNTER
VIANEY MCKENZIE Key: [id number] - PA Case ID: [id number] - Rx #: [id number] help? Call us at [phone]  PA Status for Patients»  Outcome  Deniedon April 7  Your PA request has been denied. Additional information will be provided in the denial communication. (Message 1140) Your PA request has been denied. Additional information will be provided in the denial communication. (Message 1145)  Drug  Sildenafil Citrate 20MG tablets  Form  [employer] Electronic PA Form (2017 NCPDP)  Original Claim Info  R6,[id number]

## 2022-05-18 RX ORDER — MELOXICAM 15 MG/1
TABLET ORAL
Qty: 30 TABLET | Refills: 1 | Status: SHIPPED | OUTPATIENT
Start: 2022-05-18 | End: 2022-07-27

## 2022-06-06 RX ORDER — LEVOTHYROXINE SODIUM 0.1 MG/1
TABLET ORAL
Qty: 90 TABLET | Refills: 0 | Status: SHIPPED | OUTPATIENT
Start: 2022-06-06 | End: 2022-09-19

## 2022-06-20 RX ORDER — LISINOPRIL 40 MG/1
TABLET ORAL
Qty: 90 TABLET | Refills: 1 | Status: SHIPPED | OUTPATIENT
Start: 2022-06-20 | End: 2022-12-29

## 2022-06-29 RX ORDER — BLOOD SUGAR DIAGNOSTIC
STRIP MISCELLANEOUS
Qty: 100 EACH | Refills: 3 | Status: SHIPPED | OUTPATIENT
Start: 2022-06-29

## 2022-07-17 DIAGNOSIS — E11.65 UNCONTROLLED TYPE 2 DIABETES MELLITUS WITH HYPERGLYCEMIA: ICD-10-CM

## 2022-07-18 RX ORDER — SITAGLIPTIN 100 MG/1
TABLET, FILM COATED ORAL
Qty: 90 TABLET | Refills: 0 | Status: SHIPPED | OUTPATIENT
Start: 2022-07-18 | End: 2022-10-20

## 2022-07-27 RX ORDER — MELOXICAM 15 MG/1
TABLET ORAL
Qty: 30 TABLET | Refills: 0 | Status: SHIPPED | OUTPATIENT
Start: 2022-07-27 | End: 2022-08-26

## 2022-08-26 RX ORDER — MELOXICAM 15 MG/1
TABLET ORAL
Qty: 30 TABLET | Refills: 0 | Status: SHIPPED | OUTPATIENT
Start: 2022-08-26 | End: 2022-09-28

## 2022-09-19 RX ORDER — LEVOTHYROXINE SODIUM 100 UG/1
TABLET ORAL
Qty: 90 TABLET | Refills: 0 | Status: SHIPPED | OUTPATIENT
Start: 2022-09-19 | End: 2022-12-19

## 2022-09-28 RX ORDER — MELOXICAM 15 MG/1
TABLET ORAL
Qty: 30 TABLET | Refills: 0 | Status: SHIPPED | OUTPATIENT
Start: 2022-09-28 | End: 2022-10-27

## 2022-10-07 ENCOUNTER — OFFICE VISIT (OUTPATIENT)
Dept: ENDOCRINOLOGY | Facility: CLINIC | Age: 57
End: 2022-10-07

## 2022-10-07 VITALS
SYSTOLIC BLOOD PRESSURE: 138 MMHG | DIASTOLIC BLOOD PRESSURE: 90 MMHG | HEIGHT: 74 IN | HEART RATE: 86 BPM | OXYGEN SATURATION: 96 % | BODY MASS INDEX: 37.06 KG/M2 | WEIGHT: 288.8 LBS

## 2022-10-07 DIAGNOSIS — E11.65 UNCONTROLLED TYPE 2 DIABETES MELLITUS WITH HYPERGLYCEMIA: Primary | ICD-10-CM

## 2022-10-07 LAB
EXPIRATION DATE: ABNORMAL
EXPIRATION DATE: NORMAL
GLUCOSE BLDC GLUCOMTR-MCNC: 150 MG/DL (ref 70–130)
HBA1C MFR BLD: 6.5 %
Lab: ABNORMAL
Lab: NORMAL

## 2022-10-07 PROCEDURE — 83036 HEMOGLOBIN GLYCOSYLATED A1C: CPT | Performed by: INTERNAL MEDICINE

## 2022-10-07 PROCEDURE — 82947 ASSAY GLUCOSE BLOOD QUANT: CPT | Performed by: INTERNAL MEDICINE

## 2022-10-07 PROCEDURE — 99213 OFFICE O/P EST LOW 20 MIN: CPT | Performed by: INTERNAL MEDICINE

## 2022-10-07 RX ORDER — SILDENAFIL CITRATE 20 MG/1
TABLET ORAL
Qty: 20 TABLET | Refills: 3 | Status: SHIPPED | OUTPATIENT
Start: 2022-10-07

## 2022-10-07 RX ORDER — BUDESONIDE AND FORMOTEROL FUMARATE DIHYDRATE 160; 4.5 UG/1; UG/1
3 AEROSOL RESPIRATORY (INHALATION)
Qty: 1 EACH | Refills: 5 | Status: SHIPPED | OUTPATIENT
Start: 2022-10-07

## 2022-10-07 NOTE — ASSESSMENT & PLAN NOTE
Diabetes is improving with treatment.   Continue current treatment regimen.  Diabetes will be reassessed in 6 months.    a1c down from 9.4 to 6.5

## 2022-10-07 NOTE — PROGRESS NOTES
"     Office Note      Date: 10/07/2022  Patient Name: Ken Richard  MRN: [id number]  : 1965    Chief Complaint   Patient presents with   • Diabetes       History of Present Illness:   Ken Richard is a 57 y.o. male who presents for Diabetes - type 2  Last time  We added farxiga to januvia  He tolerates that pretty well  bg checks are done daily  Usually under 130.  No hypos   He is down  16 pounds.   Tolerates the medication  Well       Last A1c:  Hemoglobin A1C   Date Value Ref Range Status   10/07/2022 6.5 % Final       Changes in health since last visit: changed jobs is more active . Last eye exam up to date.    Subjective       Review of Systems:   Review of Systems   HENT: Positive for sinus pressure.    Psychiatric/Behavioral: Negative for sleep disturbance.       The following portions of the patient's history were reviewed and updated as appropriate: allergies, current medications, past family history, past medical history, past social history, past surgical history and problem list.    Objective     Visit Vitals  /90   Pulse 86   Ht 188 cm (74\")   Wt 131 kg (288 lb 12.8 oz)   SpO2 96%   BMI 37.08 kg/m²       Labs:    CMP  Lab Results   Component Value Date    GLUCOSE 261 (H) 2022    BUN 15 2022    CREATININE 1.05 2022    EGFRIFNONA 89 03/15/2019    BCR 14.3 2022    K 4.8 2022    CO2 23.9 2022    CALCIUM 9.3 2022    AST 23 2022    ALT 60 (H) 2022        CBC w/DIFF  Lab Results   Component Value Date    WBC 6.55 03/15/2019    RBC 4.38 03/15/2019    HGB 14.3 03/15/2019    HCT 43.3 03/15/2019    MCV 98.9 03/15/2019    MCH 32.6 (H) 03/15/2019    MCHC 33.0 03/15/2019    RDW 12.6 03/15/2019    RDWSD 45.5 03/15/2019    MPV 10.7 03/15/2019     03/15/2019    NEUTRORELPCT 84.7 (H) 2016    LYMPHORELPCT 8.8 (L) 2016    MONORELPCT 4.6 2016    EOSRELPCT 1.3 2016    BASORELPCT 0.4 2016    AUTOIGPER 0.2 " 06/20/2016    NEUTROABS 3.86 06/20/2016    LYMPHSABS 0.40 (L) 06/20/2016    MONOSABS 0.21 06/20/2016    EOSABS 0.06 (L) 06/20/2016    BASOSABS 0.02 06/20/2016    AUTOIGNUM 0.01 06/20/2016       Physical Exam:  Physical Exam  Vitals reviewed.   Constitutional:       Appearance: Normal appearance. He is normal weight.   Cardiovascular:      Pulses:           Dorsalis pedis pulses are 2+ on the right side and 2+ on the left side.        Posterior tibial pulses are 2+ on the right side and 2+ on the left side.   Musculoskeletal:      Right foot: Normal range of motion. No deformity, bunion, Charcot foot, foot drop or prominent metatarsal heads.      Left foot: Normal range of motion. No deformity, bunion, Charcot foot, foot drop or prominent metatarsal heads.   Feet:      Right foot:      Protective Sensation: 5 sites tested. 5 sites sensed.      Skin integrity: Skin integrity normal.      Toenail Condition: Right toenails are normal.      Left foot:      Protective Sensation: 5 sites tested. 5 sites sensed.      Skin integrity: Skin integrity normal.      Toenail Condition: Left toenails are normal.      Comments: Diabetic Foot Exam Performed and Monofilament Test Performed    Neurological:      Mental Status: He is alert.   Psychiatric:         Mood and Affect: Mood normal.         Thought Content: Thought content normal.         Judgment: Judgment normal.          Assessment / Plan      Assessment & Plan:  Problem List Items Addressed This Visit        Other    Uncontrolled type 2 diabetes mellitus with hyperglycemia (HCC) - Primary    Current Assessment & Plan     Diabetes is improving with treatment.   Continue current treatment regimen.  Diabetes will be reassessed in 6 months.    a1c down from 9.4 to 6.5          Relevant Medications    dapagliflozin (Farxiga) 5 MG tablet tablet    Januvia 100 MG tablet    Other Relevant Orders    POC Glucose, Blood (Completed)    POC Glycosylated Hemoglobin (Hb A1C) (Completed)            Sebastian Lugo MD   10/07/2022

## 2022-10-20 DIAGNOSIS — E11.65 UNCONTROLLED TYPE 2 DIABETES MELLITUS WITH HYPERGLYCEMIA: ICD-10-CM

## 2022-10-20 RX ORDER — SITAGLIPTIN 100 MG/1
TABLET, FILM COATED ORAL
Qty: 90 TABLET | Refills: 0 | Status: SHIPPED | OUTPATIENT
Start: 2022-10-20 | End: 2023-01-20

## 2022-10-27 RX ORDER — MELOXICAM 15 MG/1
TABLET ORAL
Qty: 30 TABLET | Refills: 0 | Status: SHIPPED | OUTPATIENT
Start: 2022-10-27 | End: 2022-11-28

## 2022-11-28 RX ORDER — MELOXICAM 15 MG/1
TABLET ORAL
Qty: 30 TABLET | Refills: 1 | Status: SHIPPED | OUTPATIENT
Start: 2022-11-28 | End: 2023-01-30

## 2022-12-19 RX ORDER — LEVOTHYROXINE SODIUM 0.1 MG/1
TABLET ORAL
Qty: 90 TABLET | Refills: 1 | Status: SHIPPED | OUTPATIENT
Start: 2022-12-19

## 2022-12-29 RX ORDER — LISINOPRIL 40 MG/1
TABLET ORAL
Qty: 90 TABLET | Refills: 1 | Status: SHIPPED | OUTPATIENT
Start: 2022-12-29

## 2023-01-19 DIAGNOSIS — E11.65 UNCONTROLLED TYPE 2 DIABETES MELLITUS WITH HYPERGLYCEMIA: ICD-10-CM

## 2023-01-20 RX ORDER — SITAGLIPTIN 100 MG/1
TABLET, FILM COATED ORAL
Qty: 90 TABLET | Refills: 0 | Status: SHIPPED | OUTPATIENT
Start: 2023-01-20

## 2023-01-24 ENCOUNTER — PRIOR AUTHORIZATION (OUTPATIENT)
Dept: ENDOCRINOLOGY | Facility: CLINIC | Age: 58
End: 2023-01-24
Payer: COMMERCIAL

## 2023-01-24 NOTE — TELEPHONE ENCOUNTER
VIANEY MCKENZIE Key: [id number] - PA Case ID: [id number] - Rx #: [id number] help? Call us at [phone]  Outcome  Approvedon January 23  Your PA request has been approved. Additional information will be provided in the approval communication. (Message 1149)  Drug  Januvia 100MG tablets  Form  [employer] Electronic PA Form (2017 NCPDP)

## 2023-01-30 RX ORDER — MELOXICAM 15 MG/1
TABLET ORAL
Qty: 30 TABLET | Refills: 2 | Status: SHIPPED | OUTPATIENT
Start: 2023-01-30

## 2023-03-31 DIAGNOSIS — E11.65 UNCONTROLLED TYPE 2 DIABETES MELLITUS WITH HYPERGLYCEMIA: ICD-10-CM

## 2023-04-03 RX ORDER — DAPAGLIFLOZIN 5 MG/1
TABLET, FILM COATED ORAL
Qty: 90 TABLET | Refills: 0 | Status: SHIPPED | OUTPATIENT
Start: 2023-04-03

## 2023-04-18 ENCOUNTER — OFFICE VISIT (OUTPATIENT)
Dept: ENDOCRINOLOGY | Facility: CLINIC | Age: 58
End: 2023-04-18
Payer: COMMERCIAL

## 2023-04-18 VITALS
DIASTOLIC BLOOD PRESSURE: 88 MMHG | BODY MASS INDEX: 38.5 KG/M2 | OXYGEN SATURATION: 98 % | WEIGHT: 300 LBS | HEIGHT: 74 IN | SYSTOLIC BLOOD PRESSURE: 142 MMHG | HEART RATE: 86 BPM

## 2023-04-18 DIAGNOSIS — E89.0 POSTABLATIVE HYPOTHYROIDISM: ICD-10-CM

## 2023-04-18 DIAGNOSIS — E11.65 UNCONTROLLED TYPE 2 DIABETES MELLITUS WITH HYPERGLYCEMIA: Primary | ICD-10-CM

## 2023-04-18 LAB
ALBUMIN SERPL-MCNC: 4.2 G/DL (ref 3.5–5.2)
ALBUMIN UR-MCNC: <1.2 MG/DL
ALBUMIN/GLOB SERPL: 1.4 G/DL
ALP SERPL-CCNC: 73 U/L (ref 39–117)
ALT SERPL W P-5'-P-CCNC: 55 U/L (ref 1–41)
ANION GAP SERPL CALCULATED.3IONS-SCNC: 9.2 MMOL/L (ref 5–15)
AST SERPL-CCNC: 22 U/L (ref 1–40)
BILIRUB SERPL-MCNC: 0.4 MG/DL (ref 0–1.2)
BUN SERPL-MCNC: 16 MG/DL (ref 6–20)
BUN/CREAT SERPL: 13.8 (ref 7–25)
CALCIUM SPEC-SCNC: 9.9 MG/DL (ref 8.6–10.5)
CHLORIDE SERPL-SCNC: 106 MMOL/L (ref 98–107)
CHOLEST SERPL-MCNC: 175 MG/DL (ref 0–200)
CO2 SERPL-SCNC: 24.8 MMOL/L (ref 22–29)
CREAT SERPL-MCNC: 1.16 MG/DL (ref 0.76–1.27)
CREAT UR-MCNC: 97 MG/DL
EGFRCR SERPLBLD CKD-EPI 2021: 74.4 ML/MIN/1.73
EXPIRATION DATE: ABNORMAL
EXPIRATION DATE: NORMAL
GLOBULIN UR ELPH-MCNC: 3 GM/DL
GLUCOSE BLDC GLUCOMTR-MCNC: 170 MG/DL (ref 70–130)
GLUCOSE SERPL-MCNC: 163 MG/DL (ref 65–99)
HBA1C MFR BLD: 7.5 %
HDLC SERPL-MCNC: 40 MG/DL (ref 40–60)
LDLC SERPL CALC-MCNC: 102 MG/DL (ref 0–100)
LDLC/HDLC SERPL: 2.43 {RATIO}
Lab: ABNORMAL
Lab: NORMAL
MICROALBUMIN/CREAT UR: NORMAL MG/G{CREAT}
POTASSIUM SERPL-SCNC: 4.7 MMOL/L (ref 3.5–5.2)
PROT SERPL-MCNC: 7.2 G/DL (ref 6–8.5)
SODIUM SERPL-SCNC: 140 MMOL/L (ref 136–145)
TRIGL SERPL-MCNC: 190 MG/DL (ref 0–150)
TSH SERPL DL<=0.05 MIU/L-ACNC: 2.46 UIU/ML (ref 0.27–4.2)
VLDLC SERPL-MCNC: 33 MG/DL (ref 5–40)

## 2023-04-18 PROCEDURE — 80061 LIPID PANEL: CPT | Performed by: INTERNAL MEDICINE

## 2023-04-18 PROCEDURE — 83036 HEMOGLOBIN GLYCOSYLATED A1C: CPT | Performed by: INTERNAL MEDICINE

## 2023-04-18 PROCEDURE — 82043 UR ALBUMIN QUANTITATIVE: CPT | Performed by: INTERNAL MEDICINE

## 2023-04-18 PROCEDURE — 84443 ASSAY THYROID STIM HORMONE: CPT | Performed by: INTERNAL MEDICINE

## 2023-04-18 PROCEDURE — 82570 ASSAY OF URINE CREATININE: CPT | Performed by: INTERNAL MEDICINE

## 2023-04-18 PROCEDURE — 99214 OFFICE O/P EST MOD 30 MIN: CPT | Performed by: INTERNAL MEDICINE

## 2023-04-18 PROCEDURE — 82947 ASSAY GLUCOSE BLOOD QUANT: CPT | Performed by: INTERNAL MEDICINE

## 2023-04-18 PROCEDURE — 80053 COMPREHEN METABOLIC PANEL: CPT | Performed by: INTERNAL MEDICINE

## 2023-04-18 NOTE — PROGRESS NOTES
"     Office Note      Date: 2023  Patient Name: Ken Richard  MRN: [id number]  : 1965    Chief Complaint   Patient presents with   • Diabetes       History of Present Illness:   Ken Richard is a 57 y.o. male who presents for Diabetes type 2.   Current RX= /    Bg checks are done:daily fasting    Hypoglycemia :none       Last A1c: 6.5  Hemoglobin A1C   Date Value Ref Range Status   2023 7.5 % Final       Changes in health since last visit: has not been exercising as much . Has been off of his diet . Last eye exam due .  Had bronchitis back in February and had to take steroids. Sugars were particularly high then     Subjective              Review of Systems:   Review of Systems   Constitutional: Positive for activity change and appetite change.   Respiratory: Positive for cough.        The following portions of the patient's history were reviewed and updated as appropriate: allergies, current medications, past family history, past medical history, past social history, past surgical history and problem list.    Objective     Visit Vitals  /88   Pulse 86   Ht 188 cm (74\")   Wt 136 kg (300 lb)   SpO2 98%   BMI 38.52 kg/m²           Physical Exam:  Physical Exam  Vitals reviewed.   Constitutional:       Appearance: Normal appearance.   Neurological:      Mental Status: He is alert.   Psychiatric:         Mood and Affect: Mood normal.         Thought Content: Thought content normal.         Judgment: Judgment normal.          Assessment / Plan      Assessment & Plan:  Problem List Items Addressed This Visit        Other    Postablative hypothyroidism    Current Assessment & Plan     Stable  Clinically euthyroid. Thyroid levels ordered. Medication to be adjusted accordingly.         Relevant Medications    levothyroxine (SYNTHROID, LEVOTHROID) 100 MCG tablet    Other Relevant Orders    TSH    Uncontrolled type 2 diabetes mellitus with hyperglycemia - Primary    Current " Assessment & Plan      Deteriorated due to steroids.   No medications changes are needed         Relevant Medications    Januvia 100 MG tablet    Farxiga 5 MG tablet tablet    Other Relevant Orders    POC Glucose, Blood (Completed)    POC Glycosylated Hemoglobin (Hb A1C) (Completed)    Comprehensive Metabolic Panel    Lipid Panel    Microalbumin / Creatinine Urine Ratio - Urine, Clean Catch        Sebastian Lugo MD   04/18/2023

## 2023-04-19 RX ORDER — ROSUVASTATIN CALCIUM 10 MG/1
10 TABLET, COATED ORAL NIGHTLY
Qty: 30 TABLET | Refills: 11 | Status: SHIPPED | OUTPATIENT
Start: 2023-04-19 | End: 2024-04-18

## 2023-04-30 DIAGNOSIS — E11.65 UNCONTROLLED TYPE 2 DIABETES MELLITUS WITH HYPERGLYCEMIA: ICD-10-CM

## 2023-05-01 RX ORDER — SITAGLIPTIN 100 MG/1
TABLET, FILM COATED ORAL
Qty: 90 TABLET | Refills: 0 | Status: SHIPPED | OUTPATIENT
Start: 2023-05-01

## 2023-05-10 RX ORDER — MELOXICAM 15 MG/1
7.5 TABLET ORAL EVERY 12 HOURS SCHEDULED
Qty: 30 TABLET | Refills: 0 | Status: SHIPPED | OUTPATIENT
Start: 2023-05-10

## 2023-06-13 RX ORDER — MELOXICAM 15 MG/1
7.5 TABLET ORAL EVERY 12 HOURS SCHEDULED
Qty: 30 TABLET | Refills: 0 | Status: SHIPPED | OUTPATIENT
Start: 2023-06-13

## 2023-06-16 RX ORDER — LISINOPRIL 40 MG/1
TABLET ORAL
Qty: 90 TABLET | Refills: 1 | Status: SHIPPED | OUTPATIENT
Start: 2023-06-16

## 2023-06-16 NOTE — TELEPHONE ENCOUNTER
Rx Refill Note  Requested Prescriptions     Pending Prescriptions Disp Refills    lisinopril (PRINIVIL,ZESTRIL) 40 MG tablet [Pharmacy Med Name: Lisinopril 40 MG Oral Tablet] 90 tablet 0     Sig: Take 1 tablet by mouth once daily      Last office visit with prescribing clinician: 4/18/2023   Last telemedicine visit with prescribing clinician: Visit date not found   Next office visit with prescribing clinician: 10/18/2023                         Would you like a call back once the refill request has been completed: [] Yes [] No    If the office needs to give you a call back, can they leave a voicemail: [] Yes [] No    Sakina Duran CMA  06/16/23, 12:33 EDT

## 2023-07-24 DIAGNOSIS — E11.65 UNCONTROLLED TYPE 2 DIABETES MELLITUS WITH HYPERGLYCEMIA: ICD-10-CM

## 2023-07-24 RX ORDER — SITAGLIPTIN 100 MG/1
TABLET, FILM COATED ORAL
Qty: 90 TABLET | Refills: 0 | Status: SHIPPED | OUTPATIENT
Start: 2023-07-24

## 2023-07-24 NOTE — TELEPHONE ENCOUNTER
Rx Refill Note  Requested Prescriptions     Pending Prescriptions Disp Refills    Januvia 100 MG tablet [Pharmacy Med Name: Januvia 100 MG Oral Tablet] 90 tablet 0     Sig: Take 1 tablet by mouth once daily          Last office visit with prescribing clinician: 4/18/2023     Next office visit with prescribing clinician: 10/18/2023         Zaida Pack MA  07/24/23, 10:01 EDT

## 2023-08-04 RX ORDER — MELOXICAM 15 MG/1
7.5 TABLET ORAL EVERY 12 HOURS SCHEDULED
Qty: 30 TABLET | Refills: 0 | Status: SHIPPED | OUTPATIENT
Start: 2023-08-04

## 2023-09-05 RX ORDER — MELOXICAM 15 MG/1
7.5 TABLET ORAL EVERY 12 HOURS SCHEDULED
Qty: 30 TABLET | Refills: 0 | OUTPATIENT
Start: 2023-09-05

## 2023-09-05 NOTE — TELEPHONE ENCOUNTER
Rx Refill Note  Requested Prescriptions     Pending Prescriptions Disp Refills    meloxicam (MOBIC) 15 MG tablet [Pharmacy Med Name: Meloxicam 15 MG Oral Tablet] 30 tablet 0     Sig: Take 1/2 (one-half) tablet by mouth twice daily          Last office visit with prescribing clinician: 4/18/2023     Next office visit with prescribing clinician: 10/18/2023         Zaida Pack MA  09/05/23, 14:46 EDT

## 2023-10-09 RX ORDER — BLOOD SUGAR DIAGNOSTIC
STRIP MISCELLANEOUS
Qty: 100 EACH | Refills: 2 | Status: SHIPPED | OUTPATIENT
Start: 2023-10-09

## 2023-10-09 NOTE — TELEPHONE ENCOUNTER
Rx Refill Note  Requested Prescriptions     Pending Prescriptions Disp Refills    Accu-Chek Guide test strip [Pharmacy Med Name: Accu-Chek Guide In Vitro Strip] 100 each 0     Sig: USE 1 STRIP TO CHECK BLOOD GLUCOSE ONCE DAILY          Last office visit with prescribing clinician: 4/18/2023     Next office visit with prescribing clinician: 10/18/2023         Zaida Pack MA  10/09/23, 08:55 EDT

## 2023-10-10 ENCOUNTER — SPECIALTY PHARMACY (OUTPATIENT)
Dept: ENDOCRINOLOGY | Facility: CLINIC | Age: 58
End: 2023-10-10
Payer: COMMERCIAL

## 2023-10-10 NOTE — PROGRESS NOTES
Specialty Pharmacy Patient Management Program  Endocrinology Benefits Investigation      Ken is seen by a [facility] Endocrinology provider and is currently taking a target specialty medication.  The patient IS ELIGIBLE for enrollment in the Endocrinology Patient Management Program offered by [facility].     Insurance: [facility]  Medication(s) and Costs:   Farxiga 30 Days, $0  Januvia 30 Days, $5

## 2023-10-12 RX ORDER — LEVOTHYROXINE SODIUM 0.1 MG/1
TABLET ORAL
Qty: 90 TABLET | Refills: 0 | Status: SHIPPED | OUTPATIENT
Start: 2023-10-12

## 2023-10-12 NOTE — TELEPHONE ENCOUNTER
Rx Refill Note  Requested Prescriptions     Pending Prescriptions Disp Refills    levothyroxine (SYNTHROID, LEVOTHROID) 100 MCG tablet [Pharmacy Med Name: Levothyroxine Sodium 100 MCG Oral Tablet] 90 tablet 0     Sig: Take 1 tablet by mouth once daily      Last office visit with prescribing clinician:4/18/23    Next office visit with prescribing clinician:10/18/23      Laura Peña MA  10/12/23, 10:22 EDT

## 2023-10-24 DIAGNOSIS — E11.65 UNCONTROLLED TYPE 2 DIABETES MELLITUS WITH HYPERGLYCEMIA: ICD-10-CM

## 2023-10-24 RX ORDER — SITAGLIPTIN 100 MG/1
TABLET, FILM COATED ORAL
Qty: 90 TABLET | Refills: 0 | Status: SHIPPED | OUTPATIENT
Start: 2023-10-24

## 2023-10-24 NOTE — TELEPHONE ENCOUNTER
Rx Refill Note  Requested Prescriptions     Pending Prescriptions Disp Refills    Januvia 100 MG tablet [Pharmacy Med Name: Januvia 100 MG Oral Tablet] 90 tablet 0     Sig: Take 1 tablet by mouth once daily          Last office visit with prescribing clinician: 4/18/2023     Next office visit with prescribing clinician: 2/21/2024         Zaida Pack MA  10/24/23, 12:22 EDT

## 2023-10-30 DIAGNOSIS — E11.65 UNCONTROLLED TYPE 2 DIABETES MELLITUS WITH HYPERGLYCEMIA: ICD-10-CM

## 2023-10-31 RX ORDER — DAPAGLIFLOZIN 5 MG/1
TABLET, FILM COATED ORAL
Qty: 90 TABLET | Refills: 1 | Status: SHIPPED | OUTPATIENT
Start: 2023-10-31

## 2023-10-31 NOTE — TELEPHONE ENCOUNTER
Rx Refill Note  Requested Prescriptions     Pending Prescriptions Disp Refills    Farxiga 5 MG tablet tablet [Pharmacy Med Name: Farxiga 5 MG Oral Tablet] 90 tablet 0     Sig: Take 1 tablet by mouth once daily      Last office visit with prescribing clinician: 4/18/2023     Next office visit with prescribing clinician: 2/21/2024

## 2024-01-08 RX ORDER — LEVOTHYROXINE SODIUM 0.1 MG/1
TABLET ORAL
Qty: 90 TABLET | Refills: 0 | Status: SHIPPED | OUTPATIENT
Start: 2024-01-08

## 2024-01-08 NOTE — TELEPHONE ENCOUNTER
Rx Refill Note  Requested Prescriptions     Pending Prescriptions Disp Refills    levothyroxine (SYNTHROID, LEVOTHROID) 100 MCG tablet [Pharmacy Med Name: Levothyroxine Sodium 100 MCG Oral Tablet] 90 tablet 0     Sig: Take 1 tablet by mouth once daily          Last office visit with prescribing clinician: 4/18/2023     Next office visit with prescribing clinician: 2/21/2024         Zaida Pack MA  01/08/24, 14:01 EST

## 2024-01-15 RX ORDER — LISINOPRIL 40 MG/1
TABLET ORAL
Qty: 90 TABLET | Refills: 0 | Status: SHIPPED | OUTPATIENT
Start: 2024-01-15

## 2024-01-15 NOTE — TELEPHONE ENCOUNTER
Rx Refill Note  Requested Prescriptions     Pending Prescriptions Disp Refills    lisinopril (PRINIVIL,ZESTRIL) 40 MG tablet [Pharmacy Med Name: Lisinopril 40 MG Oral Tablet] 90 tablet 0     Sig: Take 1 tablet by mouth once daily      Last office visit with prescribing clinician: 4/18/2023   Last telemedicine visit with prescribing clinician: Visit date not found   Next office visit with prescribing clinician: 2/21/2024                         Would you like a call back once the refill request has been completed: [] Yes [] No    If the office needs to give you a call back, can they leave a voicemail: [] Yes [] No    Alivia Awan MA  01/15/24, 15:03 EST

## 2024-01-22 RX ORDER — SILDENAFIL CITRATE 20 MG/1
TABLET ORAL
Qty: 20 TABLET | Refills: 0 | Status: SHIPPED | OUTPATIENT
Start: 2024-01-22

## 2024-01-22 NOTE — TELEPHONE ENCOUNTER
Rx Refill Note  Requested Prescriptions     Pending Prescriptions Disp Refills    sildenafil (REVATIO) 20 MG tablet [Pharmacy Med Name: Sildenafil Citrate 20 MG Oral Tablet] 20 tablet 0     Sig: TAKE 1 TABLET BY MOUTH AS NEEDED FOR  SEXUAL  ACTIVITY      Last office visit with prescribing clinician: 4/18/2023     Next office visit with prescribing clinician: 2/21/2024       Laura Peña MA  01/22/24, 14:27 EST

## 2024-01-24 DIAGNOSIS — E11.65 UNCONTROLLED TYPE 2 DIABETES MELLITUS WITH HYPERGLYCEMIA: ICD-10-CM

## 2024-01-24 RX ORDER — SITAGLIPTIN 100 MG/1
TABLET, FILM COATED ORAL
Qty: 30 TABLET | Refills: 0 | Status: SHIPPED | OUTPATIENT
Start: 2024-01-24

## 2024-01-24 NOTE — TELEPHONE ENCOUNTER
Rx Refill Note  Requested Prescriptions     Pending Prescriptions Disp Refills    Januvia 100 MG tablet [Pharmacy Med Name: Januvia 100 MG Oral Tablet] 90 tablet 0     Sig: Take 1 tablet by mouth once daily      Last office visit with prescribing clinician: 4/18/2023     Next office visit with prescribing clinician: 2/21/2024       Laura Peña MA  01/24/24, 13:43 EST

## 2024-02-20 DIAGNOSIS — E11.65 UNCONTROLLED TYPE 2 DIABETES MELLITUS WITH HYPERGLYCEMIA: ICD-10-CM

## 2024-02-20 NOTE — TELEPHONE ENCOUNTER
Rx Refill Note  Requested Prescriptions     Pending Prescriptions Disp Refills    Januvia 100 MG tablet [Pharmacy Med Name: Januvia 100 MG Oral Tablet] 30 tablet 0     Sig: Take 1 tablet by mouth once daily      Last office visit with prescribing clinician: 4/18/2023     Next office visit with prescribing clinician: 2/21/2024       Laura Peña MA  02/20/24, 14:37 EST

## 2024-02-21 RX ORDER — SITAGLIPTIN 100 MG/1
TABLET, FILM COATED ORAL
Qty: 30 TABLET | Refills: 0 | Status: SHIPPED | OUTPATIENT
Start: 2024-02-21

## 2024-02-23 ENCOUNTER — OFFICE VISIT (OUTPATIENT)
Dept: ENDOCRINOLOGY | Facility: CLINIC | Age: 59
End: 2024-02-23
Payer: COMMERCIAL

## 2024-02-23 VITALS
SYSTOLIC BLOOD PRESSURE: 128 MMHG | WEIGHT: 298 LBS | BODY MASS INDEX: 38.24 KG/M2 | DIASTOLIC BLOOD PRESSURE: 82 MMHG | HEART RATE: 77 BPM | OXYGEN SATURATION: 98 % | HEIGHT: 74 IN

## 2024-02-23 DIAGNOSIS — E11.65 UNCONTROLLED TYPE 2 DIABETES MELLITUS WITH HYPERGLYCEMIA: Primary | ICD-10-CM

## 2024-02-23 DIAGNOSIS — E89.0 POSTABLATIVE HYPOTHYROIDISM: ICD-10-CM

## 2024-02-23 LAB
ALBUMIN SERPL-MCNC: 4.5 G/DL (ref 3.5–5.2)
ALBUMIN UR-MCNC: <1.2 MG/DL
ALBUMIN/GLOB SERPL: 1.7 G/DL
ALP SERPL-CCNC: 80 U/L (ref 39–117)
ALT SERPL W P-5'-P-CCNC: 39 U/L (ref 1–41)
ANION GAP SERPL CALCULATED.3IONS-SCNC: 13.2 MMOL/L (ref 5–15)
AST SERPL-CCNC: 17 U/L (ref 1–40)
BILIRUB SERPL-MCNC: 0.4 MG/DL (ref 0–1.2)
BUN SERPL-MCNC: 19 MG/DL (ref 6–20)
BUN/CREAT SERPL: 16.7 (ref 7–25)
CALCIUM SPEC-SCNC: 9.4 MG/DL (ref 8.6–10.5)
CHLORIDE SERPL-SCNC: 102 MMOL/L (ref 98–107)
CHOLEST SERPL-MCNC: 115 MG/DL (ref 0–200)
CO2 SERPL-SCNC: 23.8 MMOL/L (ref 22–29)
CREAT SERPL-MCNC: 1.14 MG/DL (ref 0.76–1.27)
CREAT UR-MCNC: 81.6 MG/DL
EGFRCR SERPLBLD CKD-EPI 2021: 75.5 ML/MIN/1.73
EXPIRATION DATE: ABNORMAL
EXPIRATION DATE: ABNORMAL
GLOBULIN UR ELPH-MCNC: 2.6 GM/DL
GLUCOSE BLDC GLUCOMTR-MCNC: 216 MG/DL (ref 70–130)
GLUCOSE SERPL-MCNC: 207 MG/DL (ref 65–99)
HBA1C MFR BLD: 8.1 % (ref 4.5–5.7)
HDLC SERPL-MCNC: 30 MG/DL (ref 40–60)
LDLC SERPL CALC-MCNC: 28 MG/DL (ref 0–100)
LDLC/HDLC SERPL: 0.17 {RATIO}
Lab: ABNORMAL
Lab: ABNORMAL
MICROALBUMIN/CREAT UR: NORMAL MG/G{CREAT}
POTASSIUM SERPL-SCNC: 4.2 MMOL/L (ref 3.5–5.2)
PROT SERPL-MCNC: 7.1 G/DL (ref 6–8.5)
SODIUM SERPL-SCNC: 139 MMOL/L (ref 136–145)
TRIGL SERPL-MCNC: 399 MG/DL (ref 0–150)
TSH SERPL DL<=0.05 MIU/L-ACNC: 4.73 UIU/ML (ref 0.27–4.2)
VLDLC SERPL-MCNC: 57 MG/DL (ref 5–40)

## 2024-02-23 PROCEDURE — 80053 COMPREHEN METABOLIC PANEL: CPT | Performed by: INTERNAL MEDICINE

## 2024-02-23 PROCEDURE — 82570 ASSAY OF URINE CREATININE: CPT | Performed by: INTERNAL MEDICINE

## 2024-02-23 PROCEDURE — 80061 LIPID PANEL: CPT | Performed by: INTERNAL MEDICINE

## 2024-02-23 PROCEDURE — 82043 UR ALBUMIN QUANTITATIVE: CPT | Performed by: INTERNAL MEDICINE

## 2024-02-23 PROCEDURE — 84443 ASSAY THYROID STIM HORMONE: CPT | Performed by: INTERNAL MEDICINE

## 2024-02-23 RX ORDER — MELOXICAM 15 MG/1
15 TABLET ORAL DAILY
Qty: 30 TABLET | Refills: 5 | Status: SHIPPED | OUTPATIENT
Start: 2024-02-23

## 2024-02-23 NOTE — PROGRESS NOTES
"     Office Note      Date: 2024  Patient Name: Ken Richard  MRN: [id number]  : 1965    Chief Complaint   Patient presents with    Diabetes     Type II    Hypothyroidism       History of Present Illness:   Ken Richard is a 58 y.o. male who presents for Diabetes type 2.   Current RX- januvia and farxiga     Bg checks are done:daily   Hypoglycemia :none       Was seeing unusually high numbers on his meter but put new batteries in it and  the numbers came back in line     Last A1c:7.5  Hemoglobin A1C   Date Value Ref Range Status   2024 8.1 (A) 4.5 - 5.7 % Final       Changes in health since last visit: none . Last eye exam  due and advised. .    Subjective            Review of Systems:   Review of Systems   Endocrine: Positive for polydipsia and polyuria.       The following portions of the patient's history were reviewed and updated as appropriate: allergies, current medications, past family history, past medical history, past social history, past surgical history, and problem list.    Objective     Visit Vitals  /82 (BP Location: Left arm, Patient Position: Sitting, Cuff Size: Adult)   Pulse 77   Ht 188 cm (74\")   Wt 135 kg (298 lb)   SpO2 98%   BMI 38.26 kg/m²           Physical Exam:  Physical Exam  Vitals reviewed.   Constitutional:       Appearance: Normal appearance. He is normal weight.   Cardiovascular:      Pulses:           Dorsalis pedis pulses are 2+ on the right side and 2+ on the left side.        Posterior tibial pulses are 2+ on the right side and 2+ on the left side.   Musculoskeletal:      Right foot: Normal range of motion. No deformity, bunion, Charcot foot, foot drop or prominent metatarsal heads.      Left foot: Normal range of motion. No deformity, bunion, Charcot foot, foot drop or prominent metatarsal heads.   Feet:      Right foot:      Protective Sensation: 10 sites tested.  10 sites sensed.      Skin integrity: Skin integrity normal.      Toenail " Condition: Right toenails are normal.      Left foot:      Protective Sensation: 10 sites tested.  10 sites sensed.      Skin integrity: Skin integrity normal.      Toenail Condition: Left toenails are normal.      Comments: Diabetic Foot Exam Performed    Neurological:      Mental Status: He is alert.   Psychiatric:         Mood and Affect: Mood normal.         Thought Content: Thought content normal.         Judgment: Judgment normal.          Assessment / Plan      Assessment & Plan:  Problem List Items Addressed This Visit          Other    Postablative hypothyroidism    Current Assessment & Plan     Clinically euthyroid. Thyroid levels ordered. Medication to be adjusted accordingly.          Relevant Medications    levothyroxine (SYNTHROID, LEVOTHROID) 100 MCG tablet    Other Relevant Orders    TSH    Uncontrolled type 2 diabetes mellitus with hyperglycemia - Primary    Current Assessment & Plan      Worse. A1c up to over 8 again  We reviewed diet and exercise guidelines and medication options   He will work on his diet and exercise. We will add lyudmila if not better next time          Relevant Medications    Farxiga 5 MG tablet tablet    Januvia 100 MG tablet    Other Relevant Orders    POC Glucose, Blood (Completed)    POC Glycosylated Hemoglobin (Hb A1C) (Completed)    Microalbumin / Creatinine Urine Ratio - Urine, Clean Catch    Lipid Panel    Comprehensive Metabolic Panel         Electronically signed by : Sebastian Lugo MD  02/23/2024

## 2024-02-23 NOTE — ASSESSMENT & PLAN NOTE
Worse. A1c up to over 8 again  We reviewed diet and exercise guidelines and medication options   He will work on his diet and exercise. We will add lyudmila if not better next time

## 2024-04-01 DIAGNOSIS — E11.65 UNCONTROLLED TYPE 2 DIABETES MELLITUS WITH HYPERGLYCEMIA: ICD-10-CM

## 2024-04-01 RX ORDER — SITAGLIPTIN 100 MG/1
TABLET, FILM COATED ORAL
Qty: 30 TABLET | Refills: 0 | Status: SHIPPED | OUTPATIENT
Start: 2024-04-01

## 2024-04-01 NOTE — TELEPHONE ENCOUNTER
Rx Refill Note  Requested Prescriptions     Pending Prescriptions Disp Refills    Januvia 100 MG tablet [Pharmacy Med Name: Januvia 100 MG Oral Tablet] 30 tablet 0     Sig: Take 1 tablet by mouth once daily          Last office visit with prescribing clinician: 2/23/2024     Next office visit with prescribing clinician: 6/10/2024         Zaida Pack MA  04/01/24, 13:27 EDT

## 2024-04-08 RX ORDER — LEVOTHYROXINE SODIUM 0.1 MG/1
TABLET ORAL
Qty: 90 TABLET | Refills: 0 | Status: SHIPPED | OUTPATIENT
Start: 2024-04-08

## 2024-04-08 NOTE — TELEPHONE ENCOUNTER
Rx Refill Note  Requested Prescriptions     Pending Prescriptions Disp Refills    levothyroxine (SYNTHROID, LEVOTHROID) 100 MCG tablet [Pharmacy Med Name: Levothyroxine Sodium 100 MCG Oral Tablet] 90 tablet 0     Sig: Take 1 tablet by mouth once daily        Last office visit with prescribing clinician: 2/23/2024      Next office visit with prescribing clinician: 6/10/2024       Carrie Atnonio (Jodi)  04/08/24, 13:20 EDT

## 2024-04-15 RX ORDER — LISINOPRIL 40 MG/1
TABLET ORAL
Qty: 90 TABLET | Refills: 0 | Status: SHIPPED | OUTPATIENT
Start: 2024-04-15

## 2024-04-15 NOTE — TELEPHONE ENCOUNTER
Rx Refill Note  Requested Prescriptions     Pending Prescriptions Disp Refills    lisinopril (PRINIVIL,ZESTRIL) 40 MG tablet [Pharmacy Med Name: Lisinopril 40 MG Oral Tablet] 90 tablet 0     Sig: Take 1 tablet by mouth once daily      Last office visit with prescribing clinician: 2/23/2024   Last telemedicine visit with prescribing clinician: Visit date not found   Next office visit with prescribing clinician: 6/10/2024                         Would you like a call back once the refill request has been completed: [] Yes [] No    If the office needs to give you a call back, can they leave a voicemail: [] Yes [] No    Amber Snider MA  04/15/24, 14:31 EDT

## 2024-04-21 DIAGNOSIS — E11.65 UNCONTROLLED TYPE 2 DIABETES MELLITUS WITH HYPERGLYCEMIA: ICD-10-CM

## 2024-04-22 RX ORDER — DAPAGLIFLOZIN 5 MG/1
5 TABLET, FILM COATED ORAL DAILY
Qty: 90 TABLET | Refills: 0 | Status: SHIPPED | OUTPATIENT
Start: 2024-04-22

## 2024-04-22 NOTE — TELEPHONE ENCOUNTER
Rx Refill Note  Requested Prescriptions     Pending Prescriptions Disp Refills    dapagliflozin (FARXIGA) 5 MG tablet tablet [Pharmacy Med Name: Dapagliflozin Propanediol 5 MG Oral Tablet] 90 tablet 0     Sig: Take 1 tablet by mouth once daily          Last office visit with prescribing clinician: 2/23/2024     Next office visit with prescribing clinician: 6/10/2024         Zaida Pack MA  04/22/24, 10:50 EDT

## 2024-05-02 DIAGNOSIS — E11.65 UNCONTROLLED TYPE 2 DIABETES MELLITUS WITH HYPERGLYCEMIA: ICD-10-CM

## 2024-05-02 RX ORDER — SITAGLIPTIN 100 MG/1
TABLET, FILM COATED ORAL
Qty: 30 TABLET | Refills: 0 | Status: SHIPPED | OUTPATIENT
Start: 2024-05-02

## 2024-05-02 NOTE — TELEPHONE ENCOUNTER
Rx Refill Note  Requested Prescriptions     Pending Prescriptions Disp Refills    Januvia 100 MG tablet [Pharmacy Med Name: Januvia 100 MG Oral Tablet] 30 tablet 0     Sig: Take 1 tablet by mouth once daily      Last office visit with prescribing clinician: 2/23/2024   Last telemedicine visit with prescribing clinician: Visit date not found   Next office visit with prescribing clinician: 6/10/2024                         Would you like a call back once the refill request has been completed: [] Yes [] No    If the office needs to give you a call back, can they leave a voicemail: [] Yes [] No    Amber Snider MA  05/02/24, 09:39 EDT

## 2024-06-02 DIAGNOSIS — E11.65 UNCONTROLLED TYPE 2 DIABETES MELLITUS WITH HYPERGLYCEMIA: ICD-10-CM

## 2024-06-04 RX ORDER — SITAGLIPTIN 100 MG/1
TABLET, FILM COATED ORAL
Qty: 30 TABLET | Refills: 3 | Status: SHIPPED | OUTPATIENT
Start: 2024-06-04

## 2024-06-04 NOTE — TELEPHONE ENCOUNTER
Rx Refill Note  Requested Prescriptions     Pending Prescriptions Disp Refills    Januvia 100 MG tablet [Pharmacy Med Name: Januvia 100 MG Oral Tablet] 30 tablet 3     Sig: Take 1 tablet by mouth once daily      Last office visit with prescribing clinician: 2/23/2024     Next office visit with prescribing clinician: 9/24/2024       Rika Lyles MA  06/04/24, 09:13 EDT

## 2024-06-06 ENCOUNTER — PRIOR AUTHORIZATION (OUTPATIENT)
Dept: ENDOCRINOLOGY | Facility: CLINIC | Age: 59
End: 2024-06-06
Payer: COMMERCIAL

## 2024-06-07 NOTE — TELEPHONE ENCOUNTER
VIANEY MCKENZIE (Key: [id number])  PA Case ID #: [id number]  Rx #: [id number]  Need Help? Call us at [phone]  Outcome  Approved on June 6  Your PA request has been approved. Additional information will be provided in the approval communication. (Message 1149)  Authorization Expiration Date: 6/5/2025  Drug  Januvia 100MG tablets  ePA cloud logo  Form  [facility] Electronic PA Form (2017 [employer])

## 2024-06-18 RX ORDER — ROSUVASTATIN CALCIUM 10 MG/1
10 TABLET, COATED ORAL NIGHTLY
Qty: 30 TABLET | Refills: 3 | Status: SHIPPED | OUTPATIENT
Start: 2024-06-18

## 2024-06-18 NOTE — TELEPHONE ENCOUNTER
Rx Refill Note  Requested Prescriptions     Pending Prescriptions Disp Refills    rosuvastatin (CRESTOR) 10 MG tablet [Pharmacy Med Name: Rosuvastatin Calcium 10 MG Oral Tablet] 30 tablet 3     Sig: TAKE 1 TABLET BY MOUTH ONCE DAILY AT NIGHT      Last office visit with prescribing clinician: 2/23/2024     Next office visit with prescribing clinician: 9/24/2024                           Sussy Simon MA  06/18/24, 08:27 EDT

## 2024-07-01 RX ORDER — LEVOTHYROXINE SODIUM 0.1 MG/1
TABLET ORAL
Qty: 90 TABLET | Refills: 0 | Status: SHIPPED | OUTPATIENT
Start: 2024-07-01

## 2024-07-01 NOTE — TELEPHONE ENCOUNTER
Rx Refill Note  Requested Prescriptions     Pending Prescriptions Disp Refills    levothyroxine (SYNTHROID, LEVOTHROID) 100 MCG tablet [Pharmacy Med Name: Levothyroxine Sodium 100 MCG Oral Tablet] 90 tablet 0     Sig: Take 1 tablet by mouth once daily      Last office visit with prescribing clinician: 2/23/2024     Next office visit with prescribing clinician: 9/24/2024                           Sussy Simon MA  07/01/24, 14:46 EDT

## 2024-07-15 RX ORDER — LISINOPRIL 40 MG/1
TABLET ORAL
Qty: 90 TABLET | Refills: 0 | Status: SHIPPED | OUTPATIENT
Start: 2024-07-15

## 2024-07-15 NOTE — TELEPHONE ENCOUNTER
Rx Refill Note  Requested Prescriptions     Pending Prescriptions Disp Refills    lisinopril (PRINIVIL,ZESTRIL) 40 MG tablet [Pharmacy Med Name: Lisinopril 40 MG Oral Tablet] 90 tablet 0     Sig: Take 1 tablet by mouth once daily      Last office visit with prescribing clinician: 2/23/2024     Next office visit with prescribing clinician: 9/24/2024                           Sussy Simon MA  07/15/24, 09:52 EDT

## 2024-07-24 DIAGNOSIS — E11.65 UNCONTROLLED TYPE 2 DIABETES MELLITUS WITH HYPERGLYCEMIA: ICD-10-CM

## 2024-07-24 RX ORDER — DAPAGLIFLOZIN 5 MG/1
5 TABLET, FILM COATED ORAL DAILY
Qty: 90 TABLET | Refills: 0 | Status: SHIPPED | OUTPATIENT
Start: 2024-07-24

## 2024-07-24 RX ORDER — BLOOD SUGAR DIAGNOSTIC
STRIP MISCELLANEOUS
Qty: 100 EACH | Refills: 0 | Status: SHIPPED | OUTPATIENT
Start: 2024-07-24

## 2024-07-24 NOTE — TELEPHONE ENCOUNTER
Rx Refill Note  Requested Prescriptions     Pending Prescriptions Disp Refills    dapagliflozin (FARXIGA) 5 MG tablet tablet [Pharmacy Med Name: Dapagliflozin Propanediol 5 MG Oral Tablet] 90 tablet 0     Sig: Take 1 tablet by mouth once daily    Accu-Chek Guide test strip [Pharmacy Med Name: Accu-Chek Guide In Vitro Strip] 100 each 0     Sig: USE 1 STRIP TO CHECK GLUCOSE ONCE DAILY      Last office visit with prescribing clinician: 2/23/2024      Next office visit with prescribing clinician: 9/24/2024                  Tommy Dutton MA  07/24/24, 08:21 EDT

## 2024-08-12 RX ORDER — MELOXICAM 15 MG/1
15 TABLET ORAL DAILY
Qty: 30 TABLET | Refills: 0 | Status: SHIPPED | OUTPATIENT
Start: 2024-08-12

## 2024-08-12 NOTE — TELEPHONE ENCOUNTER
Rx Refill Note  Requested Prescriptions     Pending Prescriptions Disp Refills    meloxicam (MOBIC) 15 MG tablet [Pharmacy Med Name: Meloxicam 15 MG Oral Tablet] 30 tablet 0     Sig: Take 1 tablet by mouth once daily          Last office visit with prescribing clinician: 2/23/2024     Next office visit with prescribing clinician: 9/24/2024         Zaida Pack MA  08/12/24, 15:49 EDT

## 2024-09-17 RX ORDER — MELOXICAM 15 MG/1
15 TABLET ORAL DAILY
Qty: 30 TABLET | Refills: 0 | Status: SHIPPED | OUTPATIENT
Start: 2024-09-17

## 2024-09-23 ENCOUNTER — SPECIALTY PHARMACY (OUTPATIENT)
Dept: GENERAL RADIOLOGY | Facility: HOSPITAL | Age: 59
End: 2024-09-23
Payer: COMMERCIAL

## 2024-09-24 ENCOUNTER — OFFICE VISIT (OUTPATIENT)
Age: 59
End: 2024-09-24
Payer: COMMERCIAL

## 2024-09-24 VITALS
OXYGEN SATURATION: 98 % | BODY MASS INDEX: 37.99 KG/M2 | WEIGHT: 296 LBS | HEIGHT: 74 IN | HEART RATE: 77 BPM | DIASTOLIC BLOOD PRESSURE: 72 MMHG | SYSTOLIC BLOOD PRESSURE: 128 MMHG

## 2024-09-24 DIAGNOSIS — E11.65 UNCONTROLLED TYPE 2 DIABETES MELLITUS WITH HYPERGLYCEMIA: Primary | ICD-10-CM

## 2024-09-24 LAB
EXPIRATION DATE: ABNORMAL
EXPIRATION DATE: ABNORMAL
GLUCOSE BLDC GLUCOMTR-MCNC: 180 MG/DL (ref 70–130)
HBA1C MFR BLD: 7.8 % (ref 4.5–5.7)
Lab: ABNORMAL
Lab: ABNORMAL

## 2024-09-24 PROCEDURE — 83036 HEMOGLOBIN GLYCOSYLATED A1C: CPT | Performed by: INTERNAL MEDICINE

## 2024-09-24 PROCEDURE — 82947 ASSAY GLUCOSE BLOOD QUANT: CPT | Performed by: INTERNAL MEDICINE

## 2024-09-24 PROCEDURE — 99213 OFFICE O/P EST LOW 20 MIN: CPT | Performed by: INTERNAL MEDICINE

## 2024-09-24 RX ORDER — DAPAGLIFLOZIN 10 MG/1
10 TABLET, FILM COATED ORAL DAILY
COMMUNITY

## 2024-09-24 RX ORDER — PREDNISONE 20 MG/1
20 TABLET ORAL DAILY
COMMUNITY
Start: 2024-06-14 | End: 2024-09-24

## 2024-09-27 RX ORDER — LEVOTHYROXINE SODIUM 100 UG/1
TABLET ORAL
Qty: 90 TABLET | Refills: 0 | Status: SHIPPED | OUTPATIENT
Start: 2024-09-27

## 2024-10-04 DIAGNOSIS — E11.65 UNCONTROLLED TYPE 2 DIABETES MELLITUS WITH HYPERGLYCEMIA: ICD-10-CM

## 2024-10-04 RX ORDER — SITAGLIPTIN 100 MG/1
TABLET, FILM COATED ORAL
Qty: 90 TABLET | Refills: 1 | Status: SHIPPED | OUTPATIENT
Start: 2024-10-04

## 2024-10-04 RX ORDER — ROSUVASTATIN CALCIUM 10 MG/1
10 TABLET, COATED ORAL NIGHTLY
Qty: 90 TABLET | Refills: 1 | Status: SHIPPED | OUTPATIENT
Start: 2024-10-04

## 2024-10-04 NOTE — TELEPHONE ENCOUNTER
Rx Refill Note  Requested Prescriptions     Pending Prescriptions Disp Refills    Januvia 100 MG tablet [Pharmacy Med Name: Januvia 100 MG Oral Tablet] 30 tablet 0     Sig: Take 1 tablet by mouth once daily    rosuvastatin (CRESTOR) 10 MG tablet [Pharmacy Med Name: Rosuvastatin Calcium 10 MG Oral Tablet] 30 tablet 0     Sig: TAKE 1 TABLET BY MOUTH ONCE DAILY AT NIGHT          Last office visit with prescribing clinician: 9/24/2024     Next office visit with prescribing clinician: 3/5/2025         Zaida Pack MA  10/04/24, 09:52 EDT

## 2024-10-08 RX ORDER — LISINOPRIL 40 MG/1
TABLET ORAL
Qty: 90 TABLET | Refills: 1 | Status: SHIPPED | OUTPATIENT
Start: 2024-10-08

## 2024-10-08 NOTE — TELEPHONE ENCOUNTER
Rx Refill Note  Requested Prescriptions     Pending Prescriptions Disp Refills    lisinopril (PRINIVIL,ZESTRIL) 40 MG tablet [Pharmacy Med Name: Lisinopril 40 MG Oral Tablet] 90 tablet 0     Sig: Take 1 tablet by mouth once daily      Last office visit with prescribing clinician:   9/24/2024    Next office visit with prescribing clinician: 3/5/2025

## 2024-10-15 RX ORDER — MELOXICAM 15 MG/1
15 TABLET ORAL DAILY
Qty: 30 TABLET | Refills: 0 | Status: SHIPPED | OUTPATIENT
Start: 2024-10-15

## 2024-10-15 NOTE — TELEPHONE ENCOUNTER
Rx Refill Note  Requested Prescriptions     Pending Prescriptions Disp Refills    meloxicam (MOBIC) 15 MG tablet [Pharmacy Med Name: Meloxicam 15 MG Oral Tablet] 30 tablet 0     Sig: Take 1 tablet by mouth once daily          Last office visit with prescribing clinician: 9/24/2024     Next office visit with prescribing clinician: 3/5/2025         Zaida Pack MA  10/15/24, 11:10 EDT

## 2024-10-16 RX ORDER — BLOOD SUGAR DIAGNOSTIC
STRIP MISCELLANEOUS
Qty: 100 EACH | Refills: 1 | Status: SHIPPED | OUTPATIENT
Start: 2024-10-16

## 2024-10-16 NOTE — TELEPHONE ENCOUNTER
Rx Refill Note  Requested Prescriptions     Pending Prescriptions Disp Refills    Accu-Chek Guide test strip [Pharmacy Med Name: Accu-Chek Guide In Vitro Strip] 100 each 0     Sig: USE 1  ONCE DAILY      Last office visit with prescribing clinician: 9/24/2024     Next office visit with prescribing clinician: 3/5/2025

## 2024-11-11 RX ORDER — MELOXICAM 15 MG/1
15 TABLET ORAL DAILY
Qty: 30 TABLET | Refills: 0 | OUTPATIENT
Start: 2024-11-11

## 2024-12-26 RX ORDER — LEVOTHYROXINE SODIUM 100 UG/1
TABLET ORAL
Qty: 90 TABLET | Refills: 0 | Status: SHIPPED | OUTPATIENT
Start: 2024-12-26

## 2024-12-26 NOTE — TELEPHONE ENCOUNTER
Rx Refill Note  Requested Prescriptions     Pending Prescriptions Disp Refills    levothyroxine (SYNTHROID, LEVOTHROID) 100 MCG tablet [Pharmacy Med Name: Levothyroxine Sodium 100 MCG Oral Tablet] 90 tablet 0     Sig: Take 1 tablet by mouth once daily      Last office visit with prescribing clinician: 9/24/2024      Next office visit with prescribing clinician: 3/5/2025       Dana Jones MA  12/26/24, 10:14 EST

## 2025-01-31 ENCOUNTER — TELEPHONE (OUTPATIENT)
Age: 60
End: 2025-01-31
Payer: COMMERCIAL

## 2025-01-31 DIAGNOSIS — E11.65 UNCONTROLLED TYPE 2 DIABETES MELLITUS WITH HYPERGLYCEMIA: ICD-10-CM

## 2025-01-31 NOTE — TELEPHONE ENCOUNTER
Rx Refill Note  Requested Prescriptions     Pending Prescriptions Disp Refills    SITagliptin (Januvia) 100 MG tablet 90 tablet 0     Sig: Take 1 tablet by mouth Daily.      Last office visit with prescribing clinician: 9/24/2024     Next office visit with prescribing clinician: 3/5/2025       Rika Lyles MA  01/31/25, 11:01 EST

## 2025-01-31 NOTE — TELEPHONE ENCOUNTER
Caller: Ken Richard    Relationship: Self    Best call back number: [phone]     Requested Prescriptions:   Requested Prescriptions      No prescriptions requested or ordered in this encounter    [facility]    Pharmacy where request should be sent:  [facility] [place]      Last office visit with prescribing clinician: 9/24/2024     Next office visit with prescribing clinician: 3/5/2025     Does the patient have less than a 3 day supply:  [x] Yes  [] No    Would you like a call back once the refill request has been completed: [x] Yes [] No    If the office needs to give you a call back, can they leave a voicemail: [x] Yes [] No    Laura Lucas Rep   01/31/25 09:31 EST

## 2025-02-03 RX ORDER — SITAGLIPTIN 100 MG/1
100 TABLET ORAL DAILY
Qty: 90 TABLET | Refills: 3 | Status: SHIPPED | OUTPATIENT
Start: 2025-02-03

## 2025-02-03 NOTE — TELEPHONE ENCOUNTER
Caller: Ken Richard    Relationship to patient: Self    Best call back number: [phone]    Patient is needing: INSURANCE WONT COVER JANUVIA. HE IS NEEDING A NEW PRESCRIPTION SENT OVER OF AN OFF BRAND OF THIS MEDICATION TODAY. HE IS COMPLETELY OUT OF THIS MEDICATION. PATIENT WOULD LIKE A CALL BACK

## 2025-03-05 ENCOUNTER — OFFICE VISIT (OUTPATIENT)
Age: 60
End: 2025-03-05
Payer: COMMERCIAL

## 2025-03-05 VITALS
BODY MASS INDEX: 36.96 KG/M2 | WEIGHT: 288 LBS | HEIGHT: 74 IN | DIASTOLIC BLOOD PRESSURE: 76 MMHG | HEART RATE: 87 BPM | OXYGEN SATURATION: 98 % | SYSTOLIC BLOOD PRESSURE: 128 MMHG

## 2025-03-05 DIAGNOSIS — E89.0 POSTABLATIVE HYPOTHYROIDISM: ICD-10-CM

## 2025-03-05 DIAGNOSIS — E11.65 UNCONTROLLED TYPE 2 DIABETES MELLITUS WITH HYPERGLYCEMIA: Primary | ICD-10-CM

## 2025-03-05 LAB
ALBUMIN SERPL-MCNC: 4.3 G/DL (ref 3.5–5.2)
ALBUMIN/GLOB SERPL: 1.7 G/DL
ALP SERPL-CCNC: 71 U/L (ref 39–117)
ALT SERPL W P-5'-P-CCNC: 36 U/L (ref 1–41)
ANION GAP SERPL CALCULATED.3IONS-SCNC: 12 MMOL/L (ref 5–15)
AST SERPL-CCNC: 22 U/L (ref 1–40)
BILIRUB SERPL-MCNC: 0.4 MG/DL (ref 0–1.2)
BUN SERPL-MCNC: 15 MG/DL (ref 6–20)
BUN/CREAT SERPL: 13.8 (ref 7–25)
CALCIUM SPEC-SCNC: 9.6 MG/DL (ref 8.6–10.5)
CHLORIDE SERPL-SCNC: 101 MMOL/L (ref 98–107)
CHOLEST SERPL-MCNC: 102 MG/DL (ref 0–200)
CO2 SERPL-SCNC: 25 MMOL/L (ref 22–29)
CREAT SERPL-MCNC: 1.09 MG/DL (ref 0.76–1.27)
EGFRCR SERPLBLD CKD-EPI 2021: 79.2 ML/MIN/1.73
EXPIRATION DATE: ABNORMAL
EXPIRATION DATE: ABNORMAL
GLOBULIN UR ELPH-MCNC: 2.6 GM/DL
GLUCOSE BLDC GLUCOMTR-MCNC: 256 MG/DL (ref 70–130)
GLUCOSE SERPL-MCNC: 184 MG/DL (ref 65–99)
HBA1C MFR BLD: 8.4 % (ref 4.5–5.7)
HDLC SERPL-MCNC: 29 MG/DL (ref 40–60)
LDLC SERPL CALC-MCNC: 25 MG/DL (ref 0–100)
LDLC/HDLC SERPL: 0.24 {RATIO}
Lab: ABNORMAL
Lab: ABNORMAL
POTASSIUM SERPL-SCNC: 4.6 MMOL/L (ref 3.5–5.2)
PROT SERPL-MCNC: 6.9 G/DL (ref 6–8.5)
SODIUM SERPL-SCNC: 138 MMOL/L (ref 136–145)
TRIGL SERPL-MCNC: 330 MG/DL (ref 0–150)
TSH SERPL DL<=0.05 MIU/L-ACNC: 2.41 UIU/ML (ref 0.27–4.2)
VLDLC SERPL-MCNC: 48 MG/DL (ref 5–40)

## 2025-03-05 PROCEDURE — 82043 UR ALBUMIN QUANTITATIVE: CPT | Performed by: INTERNAL MEDICINE

## 2025-03-05 PROCEDURE — 82570 ASSAY OF URINE CREATININE: CPT | Performed by: INTERNAL MEDICINE

## 2025-03-05 PROCEDURE — 84443 ASSAY THYROID STIM HORMONE: CPT | Performed by: INTERNAL MEDICINE

## 2025-03-05 PROCEDURE — 80053 COMPREHEN METABOLIC PANEL: CPT | Performed by: INTERNAL MEDICINE

## 2025-03-05 PROCEDURE — 80061 LIPID PANEL: CPT | Performed by: INTERNAL MEDICINE

## 2025-03-05 RX ORDER — PIOGLITAZONE 30 MG/1
30 TABLET ORAL DAILY
Qty: 30 TABLET | Refills: 11 | Status: SHIPPED | OUTPATIENT
Start: 2025-03-05 | End: 2026-03-05

## 2025-03-05 NOTE — PROGRESS NOTES
"     Office Note      Date: 2025  Patient Name: Ken Richard  MRN: [id number]  : 1965    Chief Complaint   Patient presents with    Diabetes       History of Present Illness:   Ken Richard is a 59 y.o. male who presents for Diabetes type 2.   Current RX zituvio and farxiga     Bg checks are done:daily fasting   Hypoglycemia :none       Last A1c:  Hemoglobin A1C   Date Value Ref Range Status   2025 8.4 (A) 4.5 - 5.7 % Final       Changes in health since last visit: went a week without his sitagliptin/ had to take steroids .  Subjective           Review of Systems:   Review of Systems   Endocrine: Negative for polydipsia and polyuria.       The following portions of the patient's history were reviewed and updated as appropriate: allergies, current medications, past family history, past medical history, past social history, past surgical history, and problem list.    Objective     Visit Vitals  /76 (BP Location: Right arm, Patient Position: Sitting, Cuff Size: Adult)   Pulse 87   Ht 188 cm (74\")   Wt 131 kg (288 lb)   SpO2 98%   BMI 36.98 kg/m²           Physical Exam:  Physical Exam  Vitals reviewed.   Constitutional:       Appearance: Normal appearance. He is normal weight.   Musculoskeletal:      Right foot: Normal range of motion. No deformity, bunion, Charcot foot, foot drop or prominent metatarsal heads.      Left foot: Normal range of motion. No deformity, bunion, Charcot foot, foot drop or prominent metatarsal heads.   Neurological:      Mental Status: He is alert.   Psychiatric:         Mood and Affect: Mood normal.         Behavior: Behavior normal.         Thought Content: Thought content normal.         Judgment: Judgment normal.          Assessment / Plan      Assessment & Plan:  Problem List Items Addressed This Visit       Postablative hypothyroidism    Current Assessment & Plan     Clinically euthyroid. Thyroid levels ordered. Medication to be adjusted accordingl "          Relevant Medications    levothyroxine (SYNTHROID, LEVOTHROID) 100 MCG tablet    Other Relevant Orders    TSH    Uncontrolled type 2 diabetes mellitus with hyperglycemia - Primary    Current Assessment & Plan      Eyes- about due  Kidneys - labs ordered today  Feet are ok.   -----------------  Assessment - worse   We reviewed option and will add lyudmila         Relevant Medications    dapagliflozin Propanediol (Farxiga) 10 MG tablet    Zituvio 100 MG tablet    pioglitazone (Actos) 30 MG tablet    Other Relevant Orders    POC Glucose, Blood (Completed)    POC Glycosylated Hemoglobin (Hb A1C) (Completed)    Lipid Panel    Comprehensive Metabolic Panel    Microalbumin / Creatinine Urine Ratio - Urine, Clean Catch         Electronically signed by : Sebastian Lugo MD  03/05/2025

## 2025-03-05 NOTE — ASSESSMENT & PLAN NOTE
Eyes- about due  Kidneys - labs ordered today  Feet are ok.   -----------------  Assessment - worse   We reviewed option and will add lyudmila

## 2025-03-06 LAB
ALBUMIN UR-MCNC: <1.2 MG/DL
CREAT UR-MCNC: 60.9 MG/DL
MICROALBUMIN/CREAT UR: NORMAL MG/G{CREAT}

## 2025-03-13 RX ORDER — SILDENAFIL CITRATE 20 MG/1
TABLET ORAL
Qty: 20 TABLET | Refills: 1 | Status: SHIPPED | OUTPATIENT
Start: 2025-03-13

## 2025-03-13 NOTE — TELEPHONE ENCOUNTER
Rx Refill Note  Requested Prescriptions     Pending Prescriptions Disp Refills    sildenafil (REVATIO) 20 MG tablet [Pharmacy Med Name: Sildenafil Citrate 20 MG Oral Tablet] 20 tablet 0     Sig: TAKE 1 TABLET BY MOUTH ONCE DAILY AS NEEDED FOR SEXUAL ACTIVITY      Last office visit with prescribing clinician: 3/5/2025     Next office visit with prescribing clinician: 9/15/2025     Kely Turner MA  03/13/25, 11:53 EDT

## 2025-03-24 NOTE — TELEPHONE ENCOUNTER
Rx Refill Note  Requested Prescriptions     Pending Prescriptions Disp Refills    levothyroxine (SYNTHROID, LEVOTHROID) 100 MCG tablet [Pharmacy Med Name: Levothyroxine Sodium 100 MCG Oral Tablet] 90 tablet 0     Sig: Take 1 tablet by mouth once daily      Last office visit with prescribing clinician: 3/5/2025    Next office visit with prescribing clinician: 9/15/2025    Kely Turner MA  03/24/25, 10:04 EDT

## 2025-03-25 RX ORDER — LEVOTHYROXINE SODIUM 100 UG/1
100 TABLET ORAL DAILY
Qty: 90 TABLET | Refills: 2 | Status: SHIPPED | OUTPATIENT
Start: 2025-03-25

## 2025-04-03 RX ORDER — LISINOPRIL 40 MG/1
40 TABLET ORAL DAILY
Qty: 90 TABLET | Refills: 0 | Status: SHIPPED | OUTPATIENT
Start: 2025-04-03

## 2025-04-03 NOTE — TELEPHONE ENCOUNTER
Rx Refill Note  Requested Prescriptions     Pending Prescriptions Disp Refills    lisinopril (PRINIVIL,ZESTRIL) 40 MG tablet [Pharmacy Med Name: Lisinopril 40 MG Oral Tablet] 90 tablet 0     Sig: Take 1 tablet by mouth once daily      Last office visit with prescribing clinician: 3/5/2025      Next office visit with prescribing clinician: 9/15/2025       Dana Jones MA  04/03/25, 09:42 EDT

## 2025-06-30 RX ORDER — LISINOPRIL 40 MG/1
40 TABLET ORAL DAILY
Qty: 90 TABLET | Refills: 0 | Status: SHIPPED | OUTPATIENT
Start: 2025-06-30

## 2025-06-30 NOTE — TELEPHONE ENCOUNTER
Rx Refill Note  Requested Prescriptions     Pending Prescriptions Disp Refills    lisinopril (PRINIVIL,ZESTRIL) 40 MG tablet [Pharmacy Med Name: Lisinopril 40 MG Oral Tablet] 90 tablet 0     Sig: Take 1 tablet by mouth once daily      Last office visit with prescribing clinician: 3/5/2025      Next office visit with prescribing clinician: 9/15/2025       Dana Jones MA  06/30/25, 13:59 EDT

## 2025-07-28 ENCOUNTER — PRIOR AUTHORIZATION (OUTPATIENT)
Dept: ENDOCRINOLOGY | Facility: CLINIC | Age: 60
End: 2025-07-28
Payer: COMMERCIAL

## 2025-07-28 NOTE — TELEPHONE ENCOUNTER
VIANEY MCKENZIE (Key: [id number])  PA Case ID #: [id number]  Rx #: [id number]  Need Help? Call us at [phone]  Outcome  Approved today by [facility] 2017  Your PA request has been approved. Additional information will be provided in the approval communication. (Message 114)  Effective Date: 7/28/2025  Authorization Expiration Date: 7/28/2026  Drug  Zituvio 100MG tablets  ePA cloud logo  Form  [facility] PA Form (2017 [employer])

## 2025-08-25 RX ORDER — SILDENAFIL CITRATE 20 MG/1
TABLET ORAL
Qty: 20 TABLET | Refills: 0 | Status: SHIPPED | OUTPATIENT
Start: 2025-08-25

## 2025-08-27 RX ORDER — BLOOD SUGAR DIAGNOSTIC
STRIP MISCELLANEOUS
Qty: 100 EACH | Refills: 1 | Status: SHIPPED | OUTPATIENT
Start: 2025-08-27

## (undated) DEVICE — SYR LUERLOK 30CC

## (undated) DEVICE — ENDOPATH XCEL BLADELESS TROCARS WITH STABILITY SLEEVES: Brand: ENDOPATH XCEL

## (undated) DEVICE — COVER,LIGHT HANDLE,FLX,1/PK: Brand: MEDLINE INDUSTRIES, INC.

## (undated) DEVICE — ENDOCUT SCISSOR TIP, DISPOSABLE: Brand: RENEW

## (undated) DEVICE — ST EXT IV TBG W SECUR LK 20IN

## (undated) DEVICE — LUER-LOK 360°: Brand: CONNECTA, LUER-LOK

## (undated) DEVICE — VISUALIZATION SYSTEM: Brand: CLEARIFY

## (undated) DEVICE — PK LAP LASR CHOLE 10

## (undated) DEVICE — ENDOPATH XCEL UNIVERSAL TROCAR STABLILITY SLEEVES: Brand: ENDOPATH XCEL

## (undated) DEVICE — ANTIBACTERIAL UNDYED BRAIDED (POLYGLACTIN 910), SYNTHETIC ABSORBABLE SUTURE: Brand: COATED VICRYL

## (undated) DEVICE — GOWN,PREVENTION PLUS,XXLARGE,STERILE: Brand: MEDLINE

## (undated) DEVICE — SYR LUERLOK 20CC

## (undated) DEVICE — SUT SILK 2/0 TIES 18IN A185H

## (undated) DEVICE — DRSNG SURESITE WNDW 2.38X2.75

## (undated) DEVICE — DRSNG TELFA PAD NONADH STR 1S 3X8IN

## (undated) DEVICE — Device

## (undated) DEVICE — ENDOPOUCH RETRIEVER SPECIMEN RETRIEVAL BAGS: Brand: ENDOPOUCH RETRIEVER

## (undated) DEVICE — 3M™ STERI-STRIP™ REINFORCED ADHESIVE SKIN CLOSURES, R1547, 1/2 IN X 4 IN (12 MM X 100 MM), 6 STRIPS/ENVELOPE: Brand: 3M™ STERI-STRIP™

## (undated) DEVICE — GLV SURG SENSICARE MICRO PF LF 7.5 STRL

## (undated) DEVICE — SUT VIC 0 UR6 27IN VCP603H

## (undated) DEVICE — GLV SURG SENSICARE MICRO PF LF 7 STRL

## (undated) DEVICE — [HIGH FLOW HEATED INSUFFLATOR TUBING,  DO NOT USE IF PACKAGE IS DAMAGED]

## (undated) DEVICE — SNAP KOVER: Brand: UNBRANDED